# Patient Record
Sex: MALE | Race: WHITE
[De-identification: names, ages, dates, MRNs, and addresses within clinical notes are randomized per-mention and may not be internally consistent; named-entity substitution may affect disease eponyms.]

---

## 2019-11-25 ENCOUNTER — HOSPITAL ENCOUNTER (EMERGENCY)
Dept: HOSPITAL 97 - ER | Age: 79
LOS: 1 days | Discharge: HOME | End: 2019-11-26
Payer: COMMERCIAL

## 2019-11-25 DIAGNOSIS — E86.0: Primary | ICD-10-CM

## 2019-11-25 DIAGNOSIS — I10: ICD-10-CM

## 2019-11-25 LAB
ALBUMIN SERPL BCP-MCNC: 2.6 G/DL (ref 3.4–5)
ALP SERPL-CCNC: 51 U/L (ref 45–117)
ALT SERPL W P-5'-P-CCNC: 28 U/L (ref 12–78)
AST SERPL W P-5'-P-CCNC: 35 U/L (ref 15–37)
BUN BLD-MCNC: 30 MG/DL (ref 7–18)
GLUCOSE SERPLBLD-MCNC: 248 MG/DL (ref 74–106)
HCT VFR BLD CALC: 33.4 % (ref 39.6–49)
LIPASE SERPL-CCNC: 38 U/L (ref 73–393)
LYMPHOCYTES # SPEC AUTO: 0.9 K/UL (ref 0.7–4.9)
PMV BLD: 9.4 FL (ref 7.6–11.3)
POTASSIUM SERPL-SCNC: 4.7 MMOL/L (ref 3.5–5.1)
RBC # BLD: 3.69 M/UL (ref 4.33–5.43)

## 2019-11-25 PROCEDURE — 80048 BASIC METABOLIC PNL TOTAL CA: CPT

## 2019-11-25 PROCEDURE — 96361 HYDRATE IV INFUSION ADD-ON: CPT

## 2019-11-25 PROCEDURE — 80076 HEPATIC FUNCTION PANEL: CPT

## 2019-11-25 PROCEDURE — 99284 EMERGENCY DEPT VISIT MOD MDM: CPT

## 2019-11-25 PROCEDURE — 83690 ASSAY OF LIPASE: CPT

## 2019-11-25 PROCEDURE — 85025 COMPLETE CBC W/AUTO DIFF WBC: CPT

## 2019-11-25 PROCEDURE — 36415 COLL VENOUS BLD VENIPUNCTURE: CPT

## 2019-11-25 PROCEDURE — 74177 CT ABD & PELVIS W/CONTRAST: CPT

## 2019-11-25 PROCEDURE — 96374 THER/PROPH/DIAG INJ IV PUSH: CPT

## 2019-11-26 VITALS — OXYGEN SATURATION: 100 % | DIASTOLIC BLOOD PRESSURE: 57 MMHG | SYSTOLIC BLOOD PRESSURE: 141 MMHG

## 2019-11-26 VITALS — TEMPERATURE: 98.2 F

## 2019-11-26 NOTE — EDPHYS
Physician Documentation                                                                           

 [facility]                                                                 

Name: Canelo Simon                                                                             

Age: 79 yrs                                                                                       

Sex: Male                                                                                         

: 1940                                                                                   

MRN: [id number]                                                                                   

Arrival Date: 2019                                                                          

Time: 21:09                                                                                       

Account#: [id number]                                                                            

Bed 5                                                                                             

Private MD:                                                                                       

ED Physician Buzz Hdz                                                                     

HPI:                                                                                              

                                                                                             

22:36 This 79 yrs old  Male presents to ER via Wheelchair with complaints of         pm1 

      Diarrhea.                                                                                   

22:36 The patient presents to the emergency department with diarrhea, 4-5 times per day.      pm1 

      Onset: The symptoms/episode began/occurred 3 day(s) ago. Possible causes: unknown. The      

      symptoms are aggravated by nothing. The symptoms are alleviated by nothing. Associated      

      signs and symptoms: Pertinent positives: abdominal pain, suprapubic area, Pertinent         

      negatives: dysuria, fever.                                                                  

                                                                                                  

Historical:                                                                                       

- Allergies:                                                                                      

21:42 No Known Allergies;                                                                     rv  

- PMHx:                                                                                           

21:42 Hypertension; Diabetes - NIDDM;                                                         rv  

- PSHx:                                                                                           

21:42 cardiac stents;                                                                         rv  

                                                                                                  

- Immunization history:: Adult Immunizations up to date, Flu vaccine is up to date.               

- Social history:: Smoking status: Patient/guardian denies using tobacco, the patient             

  reports quitting approximately 10 years ago.                                                    

- Ebola Screening: : No symptoms or risks identified at this time.                                

                                                                                                  

                                                                                                  

ROS:                                                                                              

22:36 Constitutional: Negative for fever, chills, and weight loss, Eyes: Negative for injury, pm1 

      pain, redness, and discharge, ENT: Negative for injury, pain, and discharge, Neck:          

      Negative for injury, pain, and swelling, Cardiovascular: Negative for chest pain,           

      palpitations, and edema, Respiratory: Negative for shortness of breath, cough,              

      wheezing, and pleuritic chest pain.                                                         

22:36 Back: Negative for injury and pain, : Negative for injury, bleeding, discharge, and       

      swelling, MS/Extremity: Negative for injury and deformity, Skin: Negative for injury,       

      rash, and discoloration, Neuro: Negative for headache, weakness, numbness, tingling,        

      and seizure.                                                                                

22:36 Abdomen/GI: Positive for abdominal pain, diarrhea, of the suprapubic area, Negative for     

      nausea, vomiting, constipation.                                                             

                                                                                                  

Exam:                                                                                             

22:36 Constitutional:  This is a well developed, well nourished patient who is awake, alert,  pm1 

      and in no acute distress. Head/Face:  Normocephalic, atraumatic. Neck:  Trachea             

      midline, no thyromegaly or masses palpated, and no cervical lymphadenopathy.  Supple,       

      full range of motion without nuchal rigidity, or vertebral point tenderness.  No            

      Meningismus. Chest/axilla:  Normal chest wall appearance and motion.  Nontender with no     

      deformity.  No lesions are appreciated. Cardiovascular:  Regular rate and rhythm with a     

      normal S1 and S2.  No gallops, murmurs, or rubs.  Normal PMI, no JVD.  No pulse             

      deficits. Respiratory:  Lungs have equal breath sounds bilaterally, clear to                

      auscultation and percussion.  No rales, rhonchi or wheezes noted.  No increased work of     

      breathing, no retractions or nasal flaring.                                                 

22:36 Back:  No spinal tenderness.  No costovertebral tenderness.  Full range of motion.          

      Skin:  Warm, dry with normal turgor.  Normal color with no rashes, no lesions, and no       

      evidence of cellulitis.                                                                     

22:36 Abdomen/GI: Inspection: abdomen appears normal, Bowel sounds: normal, Palpation: soft,      

      mild abdominal tenderness, in the suprapubic area, mass, is not appreciated, rebound        

      tenderness, is not appreciated.                                                             

22:36 Musculoskeletal/extremity: Extremities: all appear grossly normal, with no appreciated      

      pain with palpation, ROM: no acute changes, Circulation is intact in all extremities.       

                                                                                                  

Vital Signs:                                                                                      

21:42  / 42; Pulse 74; Resp 17; Temp 98.2; Pulse Ox 97% ; Weight 108.86 kg; Height 5    rv  

      ft. 10 in. (177.80 cm);                                                                     

22:30  / 51; Pulse 63; Resp 18; Pulse Ox 97% on R/A;                                    ea  

23:18  / 46; Pulse 66; Resp 18; Pulse Ox 97% on R/A;                                    ea  

                                                                                             

00:05  / 55; Pulse 62; Pulse Ox 100% on R/A;                                            ak1 

00:10  / 61; Pulse 69; Pulse Ox 100% on R/A;                                            ak1 

00:16  / 44 Supine; Pulse 62; Pulse Ox 95% ;                                            ak1 

01:15  / 57; Pulse 63; Resp 18; Pulse Ox 100% on R/A; Pain 0/10;                        lp1 

                                                                                             

21:42 Body Mass Index 34.44 (108.86 kg, 177.80 cm)                                            rv  

                                                                                                  

MDM:                                                                                              

                                                                                             

22:03 Patient medically screened.                                                             pm1 

22:04 Data reviewed: vital signs. Data interpreted: Pulse oximetry: on room air is 97 %.      pm1 

      Interpretation: normal.                                                                     

                                                                                             

00:26 Counseling: I had a detailed discussion with the patient and/or guardian regarding: the pm1 

      historical points, exam findings, and any diagnostic results supporting the                 

      discharge/admit diagnosis, lab results, radiology results, the need for outpatient          

      follow up, to return to the emergency department if symptoms worsen or persist or if        

      there are any questions or concerns that arise at home.                                     

00:26 Special discussion: I discussed with the patient the need to follow-up with the         pm1 

      PCP/specialist for the noted incidental finding on X-ray/CT scanning. adrenal adenoma       

      and recommendations by radiologist.                                                         

00:26 ED course: Offered admission to the patient for dehydration but patient prefers to go   pm1 

      home. Patient feels better post IV infusion. Will give additional bolus of fluid for 2      

      liters total infused. Patient is not vomiting.                                              

                                                                                                  

                                                                                             

22:12 Order name: Basic Metabolic Panel; Complete Time: 23:17                                 pm1 

                                                                                             

22:12 Order name: CBC with Diff; Complete Time: 22:53                                         pm1 

                                                                                             

22:12 Order name: Creatinine for Radiology; Complete Time: 22:53                              pm1 

                                                                                             

22:12 Order name: Hepatic Function; Complete Time: 23:17                                      pm1 

                                                                                             

22:12 Order name: Lipase; Complete Time: 23:17                                                pm1 

                                                                                             

22:12 Order name: IV Saline Lock; Complete Time: 22:28                                        pm1 

                                                                                             

22:12 Order name: Labs collected and sent; Complete Time: 22:34                               pm1 

                                                                                             

22:12 Order name: CT Abd/Pelvis - IV Contrast Only                                            pm1 

                                                                                                  

Administered Medications:                                                                         

                                                                                             

22:28 Drug: NS 0.9% 1000 ml Route: IV; Rate: 1000 ml; Site: right antecubital;                ea  

23:34 Follow up: Response: No adverse reaction; IV Status: Completed infusion; IV Intake:     ea  

      1000ml                                                                                      

22:47 Drug: fentaNYL (PF) 25 mcg Route: IVP; Site: right antecubital;                         ea  

23:33 Follow up: Response: No adverse reaction; Pain is decreased                             ea  

                                                                                             

00:30 Drug: NS 0.9% 1000 ml Route: IV; Rate: 1000 ml; Site: right antecubital;                ak1 

01:21 Follow up: IV Status: Completed infusion; IV Intake: 1000ml                             lp1 

                                                                                                  

                                                                                                  

Disposition:                                                                                      

03:43 Co-signature as Attending Physician, Buzz Hdz MD I agree with the assessment and tw4 

      plan of care.                                                                               

                                                                                                  

Disposition:                                                                                      

19 01:12 Discharged to Home. Impression: Diarrhea, unspecified, Dehydration.                

- Condition is Stable.                                                                            

- Discharge Instructions: Food Choices to Help Relieve Diarrhea, Adult, Dehydration,              

  Elderly, Diarrhea, Adult, Viral Gastroenteritis, Adult, Rehydration, Elderly.                   

- Prescriptions for Bentyl 20 mg Oral Tablet - take 1 tablet by ORAL route every 6                

  hours As needed; 20 tablet.                                                                     

- Medication Reconciliation Form, Thank You Letter, Antibiotic Education, Prescription            

  Opioid Use form.                                                                                

- Follow up: Emergency Department; When: As needed; Reason: Worsening of condition.               

  Follow up: Private Physician; When: 2 - 3 days; Reason: Recheck today's complaints,             

  Continuance of care, Re-evaluation by your physician.                                           

- Problem is new.                                                                                 

- Symptoms have improved.                                                                         

                                                                                                  

                                                                                                  

                                                                                                  

Signatures:                                                                                       

Dispatcher [employer]                           EDMS                                                 

Jonna Yancey RN                         RN   lp1                                                  

Patria Martínez RN                       RN   ak1                                                  

Bolivar Camarillo, NP                    NP   pm1                                                  

Coretta Shahid, RN                      Buzz Cazares ea, MD MD   tw4                                                  

Selvin Burnett RN                    RN   rv                                                   

                                                                                                  

Corrections: (The following items were deleted from the chart)                                    

01:12 01:12 2019 01:12 Discharged to Home. Impression: Diarrhea, unspecified. Condition pm1 

      is Stable. Forms are Medication Reconciliation Form, Thank You Letter, Antibiotic           

      Education, Prescription Opioid Use. Follow up: Emergency Department; When: As needed;       

      Reason: Worsening of condition. Follow up: Private Physician; When: 2 - 3 days; Reason:     

      Recheck today's complaints, Continuance of care, Re-evaluation by your physician.           

      Problem is new. Symptoms have improved. pm1                                                 

01:21 01:12 2019 01:12 Discharged to Home. Impression: Diarrhea, unspecified;           lp1 

      Dehydration. Condition is Stable. Discharge Instructions: Food Choices to Help Relieve      

      Diarrhea, Adult, Diarrhea, Adult, Viral Gastroenteritis, Adult, Dehydration, Elderly,       

      Rehydration, Elderly. Prescriptions for Bentyl 20 mg Oral Tablet - take 1 tablet by         

      ORAL route every 6 hours As needed; 20 tablet. and Forms are Medication Reconciliation      

      Form, Thank You Letter, Antibiotic Education, Prescription Opioid Use. Follow up:           

      Emergency Department; When: As needed; Reason: Worsening of condition. Follow up:           

      Private Physician; When: 2 - 3 days; Reason: Recheck today's complaints, Continuance of     

      care, Re-evaluation by your physician. Problem is new. Symptoms have improved. pm1          

                                                                                                  

**************************************************************************************************

## 2019-11-26 NOTE — RAD REPORT
EXAM DESCRIPTION:  CT abdomen and pelvis with IV contrast

 

CLINICAL HISTORY:  79-year-old male with abdominal pain, diarrhea and weakness

 

TECHNIQUE:  Axial CT imaging of the abdomen and pelvis was performed following the administration of 
intravenous contrast..   Sagittal and coronal reconstructed images were then performed.   The CT stud
y is performed according to ALARA (as low as reasonably achievable) or ALARA/IMAGE GENTLY, with autom
atic adjustment of mA and/or kV according to patient size.

Performed on: 11/25/2019 at 11:09 PM.

 

COMPARISON:  No prior studies were available for comparison.

 

FINDINGS:  Lung bases: The lung bases are clear. There is minimal bibasilar atelectasis and/or fibros
is.

Liver: The liver is normal in size and configuration. No focal hepatic abnormalities are identified. 
Liver attenuation is within normal limits.

Spleen: The spleen is normal is size, configuration and attenuation.

Gallbladder and bile duct:   The gallbladder is surgically absent. There is no biliary ductal dilatat
ion.

Pancreas: The pancreas is grossly normal in size and configuration.

Adrenal Glands: There is a 1.9 x 1.3 cm low density mass arising from the right adrenal gland measuri
ng approximately 35 Hounsfield units. This likely represents an adrenal adenoma. The left adrenal gla
nd is normal in size and configuration.

Kidneys: The kidneys are normal in size and configuration. There is no evidence of hydronephrosis. Th
ere is no evidence of nephrolithiasis. No definite solid or cystic renal mass lesions are identified.


Stomach: The stomach is grossly normal. There is no definite hiatal hernia.

Bowel: There is bowel wall thickening along the ascending colon as well as portions of the sigmoid co
luba. There is mild infiltration of the pericolonic fat adjacent to the ascending colon. Findings sugg
est underlying colitis which may be infectious, inflammatory or ischemic in etiology.

Appendix: There is no CT evidence of acute appendicitis.

Free air: There is no evidence of free air.

Free fluid: There is no evidence of free fluid.

Vasculature: The aorta is normal in contour and demonstrates extensive atherosclerotic calcification.
 There are atherosclerotic calcifications along the common iliac arteries and major aortic branch ves
sels. The aorta measures approximately 2.6 cm in maximum dimension. The inferior vena cava is grossly
 unremarkable.

Lymphadenopathy: No pathologic lymphadenopathy is identified.

Bladder: The bladder is moderately well distended and smooth in contour.

Reproductive: The prostate gland is grossly within normal limits.

Bones: There are remote postsurgical changes of the lumbar spine consistent with posterior decompress
ion and fusion at L2-L3. A dorsal column stimulator is present as well. There are degenerative change
s throughout the thoracolumbar spine. Left hip arthroplasty is present.

Soft tissues: No focal soft tissue abnormalities are identified.

 

IMPRESSION:  1. Bowel wall thickening along the ascending colon as well as portions of the sigmoid co
luba with associated pericolonic inflammation suggesting underlying colitis which may be infectious, i
nflammatory or ischemic in etiology.

2. Remote cholecystectomy.

3. Small, low-density right adrenal mass likely representing an adrenal adenoma. Recommend one-year f
ollow-up adrenal washout CT. If stable greater than or equal to one year, no further follow-up imagin
g recommended.

4. Extensive atherosclerotic calcification along the abdominal aorta and major branch vessels. The ao
rta measures approximately 2.6 cm in maximum dimension. Recommend follow-up imaging every five years.


5. Prior posterior fusion of L2-L3.

 

Electronically signed by:   Melinda Paula DO   11/25/2019 11:44 PM CST Workstation: [id number]

 

 

Due to temporary technical issues with the PACS/Fluency reporting system, reports are being signed by
 the in house radiologist as a courtesy to ensure prompt reporting. The interpreting radiologist is f
ully responsible for the content of the report.

## 2019-11-26 NOTE — ER
Nurse's Notes                                                                                     

 [facility]                                                                 

Name: Canelo Simon                                                                             

Age: 79 yrs                                                                                       

Sex: Male                                                                                         

: 1940                                                                                   

MRN: [id number]                                                                                   

Arrival Date: 2019                                                                          

Time: 21:09                                                                                       

Account#: [id number]                                                                            

Bed 5                                                                                             

Private MD:                                                                                       

Diagnosis: Diarrhea, unspecified;Dehydration                                                      

                                                                                                  

Presentation:                                                                                     

                                                                                             

21:39 Presenting complaint: Patient states: lbm for three days, having 4-5 bouts per day.     rv  

      feeling week. still able to eat and drink without nausea and vomiting. denies any           

      fever. no new prescription aside from getting a flu shot before the LBM started.            

      Transition of care: patient was not received from another setting of care. Onset of         

      symptoms was 2019 at 08:00. Risk Assessment: Do you want to hurt yourself      

      or someone else? Patient reports no desire to harm self or others. Initial Sepsis           

      Screen: Does the patient meet any 2 criteria? No. Patient's initial sepsis screen is        

      negative. Does the patient have a suspected source of infection? No. Patient's initial      

      sepsis screen is negative. Care prior to arrival: None.                                     

21:39 Method Of Arrival: Wheelchair                                                           rv  

21:39 Acuity: HANANE 3                                                                           rv  

                                                                                                  

Historical:                                                                                       

- Allergies:                                                                                      

21:42 No Known Allergies;                                                                     rv  

- PMHx:                                                                                           

21:42 Hypertension; Diabetes - NIDDM;                                                         rv  

- PSHx:                                                                                           

21:42 cardiac stents;                                                                         rv  

                                                                                                  

- Immunization history:: Adult Immunizations up to date, Flu vaccine is up to date.               

- Social history:: Smoking status: Patient/guardian denies using tobacco, the patient             

  reports quitting approximately 10 years ago.                                                    

- Ebola Screening: : No symptoms or risks identified at this time.                                

                                                                                                  

                                                                                                  

Screenin:57 Abuse screen: Denies threats or abuse. Nutritional screening: No deficits noted.        ea  

      Tuberculosis screening: No symptoms or risk factors identified. Fall Risk None              

      identified.                                                                                 

                                                                                                  

Assessment:                                                                                       

22:40 Reassessment: bedside commode placed for pt to use for stool sample.                    ak1 

22:56 General: Appears uncomfortable, Behavior is calm, cooperative, appropriate for age.     ea  

      Pain: Complains of pain in abdomen. Neuro: Level of Consciousness is awake, alert,          

      obeys commands, Oriented to person, place, time, situation. Cardiovascular: Patient's       

      skin is warm and dry. Respiratory: Airway is patent Respiratory effort is even,             

      unlabored, Respiratory pattern is regular, symmetrical. GI: Abdomen is non-distended.       

      Derm: Skin is dry, Skin is pale, Skin temperature is warm.                                  

22:59 Reassessment: Patient and/or family updated on plan of care and expected duration. Pain ea  

      level reassessed. Patient is alert, oriented x 3, equal unlabored respirations, skin        

      warm/dry/pink. Pt taken to CT.                                                              

23:03 Reassessment: pt taken to CT.                                                           ak1 

23:12 Reassessment: Patient and/or family updated on plan of care and expected duration. Pain ea  

      level reassessed. Patient is alert, oriented x 3, equal unlabored respirations, skin        

      warm/dry/pink. Pt returned from CT.                                                         

23:35 Reassessment: Patient and/or family updated on plan of care and expected duration. Pain ea  

      level reassessed. Patient is alert, oriented x 3, equal unlabored respirations, skin        

      warm/dry/pink. Provider at bedside updating pt on plan of care.                             

                                                                                                  

Vital Signs:                                                                                      

21:42  / 42; Pulse 74; Resp 17; Temp 98.2; Pulse Ox 97% ; Weight 108.86 kg; Height 5    rv  

      ft. 10 in. (177.80 cm);                                                                     

22:30  / 51; Pulse 63; Resp 18; Pulse Ox 97% on R/A;                                    ea  

23:18  / 46; Pulse 66; Resp 18; Pulse Ox 97% on R/A;                                    ea  

                                                                                             

00:05  / 55; Pulse 62; Pulse Ox 100% on R/A;                                            ak1 

00:10  / 61; Pulse 69; Pulse Ox 100% on R/A;                                            ak1 

00:16  / 44 Supine; Pulse 62; Pulse Ox 95% ;                                            ak1 

01:15  / 57; Pulse 63; Resp 18; Pulse Ox 100% on R/A; Pain 0/10;                        lp1 

                                                                                             

21:42 Body Mass Index 34.44 (108.86 kg, 177.80 cm)                                            rv  

                                                                                                  

ED Course:                                                                                        

                                                                                             

21:09 Patient arrived in ED.                                                                  ag3 

21:40 Triage completed.                                                                       rv  

21:53 Bolivar Camarillo NP is PHCP.                                                           pm1 

21:53 Buzz Hdz MD is Attending Physician.                                            pm1 

21:57 Patria Martínez, RN is Primary Nurse.                                                     ak1 

22:18 Radiology exam delayed due to lab results not completed at this time.                   2 

22:27 Inserted saline lock: 20 gauge in right antecubital area, using aseptic technique.      ea  

      Blood collected.                                                                            

22:57 Patient has correct armband on for positive identification. Bed in low position. Call   ea  

      light in reach. Side rails up X2. Adult w/ patient.                                         

22:57 Arm band placed on right wrist. Patient placed in an exam room, on a stretcher, on      ea  

      pulse oximetry.                                                                             

23:13 CT completed. Patient tolerated procedure well. Patient moved back from CT.             2 

23:17 CT Abd/Pelvis - IV Contrast Only In Process Unspecified.                                EDMS

                                                                                             

01:21 No provider procedures requiring assistance completed. IV discontinued, No              lp1 

      redness/swelling at site. Pressure dressing applied.                                        

                                                                                                  

Administered Medications:                                                                         

                                                                                             

22:28 Drug: NS 0.9% 1000 ml Route: IV; Rate: 1000 ml; Site: right antecubital;                ea  

23:34 Follow up: Response: No adverse reaction; IV Status: Completed infusion; IV Intake:     ea  

      1000ml                                                                                      

22:47 Drug: fentaNYL (PF) 25 mcg Route: IVP; Site: right antecubital;                         ea  

23:33 Follow up: Response: No adverse reaction; Pain is decreased                             ea  

                                                                                             

00:30 Drug: NS 0.9% 1000 ml Route: IV; Rate: 1000 ml; Site: right antecubital;                ak1 

01:21 Follow up: IV Status: Completed infusion; IV Intake: 1000ml                             lp1 

                                                                                                  

                                                                                                  

Intake:                                                                                           

                                                                                             

23:34 IV: 1000ml; Total: 1000ml.                                                              ea  

                                                                                             

01:21 IV: 1000ml; Total: 2000ml.                                                              lp1 

                                                                                                  

Outcome:                                                                                          

00:17 Condition: improved                                                                     ak1 

01:12 Discharge ordered by MD.                                                                pm1 

01:21 Discharged to home via wheelchair, with family.                                         lp1 

01:21 Discharge instructions given to patient, Instructed on discharge instructions, follow       

      up and referral plans. medication usage, Demonstrated understanding of instructions,        

      follow-up care, medications, Prescriptions given X 1.                                       

01:21 Patient left the ED.                                                                    lp1 

                                                                                                  

Signatures:                                                                                       

Dispatcher [employer]                           EDMS                                                 

Jonna Yancey RN RN   lp1                                                  

Patria Martínez RN RN   ak1                                                  

Bolivar Camarillo, NP                    NP   pm1                                                  

Keena Mccormick                            2                                                  

Shahid, Coretta, RN                      RN   Selvin Faust, RN                    RN   roalia George, Ange                                 ag3                                                  

                                                                                                  

**************************************************************************************************

## 2022-01-30 ENCOUNTER — HOSPITAL ENCOUNTER (EMERGENCY)
Dept: HOSPITAL 97 - ER | Age: 82
Discharge: HOME | End: 2022-01-30
Payer: COMMERCIAL

## 2022-01-30 VITALS — TEMPERATURE: 97.6 F | OXYGEN SATURATION: 100 %

## 2022-01-30 VITALS — SYSTOLIC BLOOD PRESSURE: 205 MMHG | DIASTOLIC BLOOD PRESSURE: 78 MMHG

## 2022-01-30 DIAGNOSIS — M54.32: Primary | ICD-10-CM

## 2022-01-30 DIAGNOSIS — E11.9: ICD-10-CM

## 2022-01-30 DIAGNOSIS — I10: ICD-10-CM

## 2022-01-30 PROCEDURE — 93971 EXTREMITY STUDY: CPT

## 2022-01-30 PROCEDURE — 99284 EMERGENCY DEPT VISIT MOD MDM: CPT

## 2022-01-30 NOTE — RAD REPORT
EXAM DESCRIPTION:  RAD - Femur Left - 1/30/2022 4:42 pm

 

CLINICAL HISTORY:  PAIN

 

COMPARISON:  None.

 

FINDINGS:  Two images were submitted from midshaft femur to the knee joint. Proximal femur is evaluat
ed in a separate left hip report. Tip of the femoral component is identified. Focal sclerotic changes
 in the distal femoral metaphysis could be enchondroma or bone infarct. No acute cortical or medullar
y process identifiable. Linear metallic surgical device is present in the distal shaft of the femur. 
No joint effusions seen. Degenerative changes in the knee joint are minimal. No pathologic bone proce
ss. Dense arterial tree calcifications are present. No air or foreign body in the soft tissues.

 

 

IMPRESSION:  Imaging of the distal left femur shows chronic findings as detailed above. No acute proc
ess.

 

Proximal femur detailed as part of the left hip report.

## 2022-01-30 NOTE — ER
Nurse's Notes                                                                                     

 [facility]                                                                 

Name: Canelo Simon                                                                             

Age: 81 yrs                                                                                       

Sex: Male                                                                                         

: 1940                                                                                   

MRN: [id number]                                                                                   

Arrival Date: 2022                                                                          

Time: 13:23                                                                                       

Account#: [id number]                                                                            

Bed 11                                                                                            

Private MD:                                                                                       

Diagnosis: Sciatica, left side                                                                    

                                                                                                  

Presentation:                                                                                     

                                                                                             

13:31 Chief complaint: Patient states: pain starts in lower back and radiates into hip and    vg1 

      down to left ankle. EMS states: Left hip pain x 5 days; states no injuries.                 

13:31 Method Of Arrival: EMS: [facility]                                                       vg1 

15:33 Coronavirus screen: Vaccine status: Patient reports receiving the 2nd dose of the covid vg1 

      vaccine. Client denies travel out of the [place] in the last 14 days. Ebola Screen:            

      Patient negative for fever greater than or equal to 101.5 degrees Fahrenheit, and           

      additional compatible Ebola Virus Disease symptoms. Initial Sepsis Screen: Does the         

      patient meet any 2 criteria? No. Patient's initial sepsis screen is negative. Does the      

      patient have a suspected source of infection? Yes:. Risk Assessment: Do you want to         

      hurt yourself or someone else? Patient reports no desire to harm self or others. Onset      

      of symptoms was 2022.                                                           

15:33 Acuity: HANANE 3                                                                           vg1 

                                                                                                  

Triage Assessment:                                                                                

15:47 General: Appears uncomfortable, Behavior is cooperative. Pain: Complains of pain in     vg1 

      left leg Pain currently is 10 out of 10 on a pain scale.                                    

                                                                                                  

Historical:                                                                                       

- Allergies:                                                                                      

15:38 PENICILLINS;                                                                            vg1 

- Home Meds:                                                                                      

15:38 Albuterol Inhl [Active];                                                                vg1 

15:49 atorvastatin oral [Active]; Bisacodyl Oral [Active]; cyanocobalamin (vitamin B-12) oral vg1 

      [Active]; Docusate Sodium Oral [Active]; Hydralazine Oral [Active]; Lisinopril Oral         

      [Active]; Insulin: Novolin N Sub-Q [Active]; Metoprolol Tartrate Oral [Active]; Aspirin     

      Oral [Active]; Omeprazole Oral [Active];                                                    

- PMHx:                                                                                           

15:38 Diabetes - NIDDM; Hypertension;                                                         vg1 

- PSHx:                                                                                           

15:38 Hip; Left; Back;                                                                        vg1 

                                                                                                  

- Immunization history:: Client reports receiving the 2nd dose of the Covid vaccine.              

- Social history:: Smoking status: Patient denies any tobacco usage or history of.                

                                                                                                  

                                                                                                  

Screenin:41 Abuse screen: Denies threats or abuse. Nutritional screening: No deficits noted.        ll1 

      Tuberculosis screening: No symptoms or risk factors identified. Fall Risk Ambulatory        

      Aid- Crutches/Cane/Walker (15 pts). Gait- Weak (10 pts.). Total Vela Fall Scale            

      indicates Low Risk Score (25-44 pts). Fall prevention measures have been instituted.        

      Side Rails Up X 2 Frequent Obs/Assesments occuring As available Patient and Family          

      Educated on Fall Prevention Program and strategies.                                         

                                                                                                  

Assessment:                                                                                       

16:45 Reassessment: No changes from previously documented assessment. Patient and/or family   ll1 

      updated on plan of care and expected duration. Pain level reassessed. Patient is alert,     

      oriented x 3, equal unlabored respirations, skin warm/dry/pink.                             

17:40 Reassessment: No changes from previously documented assessment. Patient and/or family   ll1 

      updated on plan of care and expected duration. Pain level reassessed. Patient is alert,     

      oriented x 3, equal unlabored respirations, skin warm/dry/pink.                             

                                                                                                  

Vital Signs:                                                                                      

15:33 Pulse 66; Resp 20; Temp 97.6; Pulse Ox 100% ; Weight 92.99 kg; Height 5 ft. 11 in.      vg1 

      (180.34 cm); Pain 10/10;                                                                    

15:47  / 78;                                                                            vg1 

15:33 Body Mass Index 28.59 (92.99 kg, 180.34 cm)                                             vg1 

                                                                                                  

ED Course:                                                                                        

13:23 Patient arrived in ED.                                                                  ds1 

15:38 Triage completed.                                                                       vg1 

15:41 [facility]                                                        kb  

15:41 Raul Guerra MD is Attending Physician.                                             kb  

15:42 Alistair Salcedo RN is Primary Nurse.                                                     ll1 

15:42 Arm band placed on Patient placed in an exam room, on a stretcher.                      ll1 

16:22 US Extremity Venous Unilateral Ltd In Process Unspecified.                              EDMS

16:42 Hip Left 2 View XRAY In Process Unspecified.                                            EDMS

16:42 Femur Left XRAY In Process Unspecified.                                                 EDMS

17:41 Patient has correct armband on for positive identification. Bed in low position. Call   ll1 

      light in reach. Side rails up X 1. Cardiac monitoring not applicable on this patient.       

17:41 No provider procedures requiring assistance completed. Patient did not have IV access   ll1 

      during this emergency room visit.                                                           

                                                                                                  

Administered Medications:                                                                         

16:04 Drug: traMADol 50 mg Route: PO;                                                         ll1 

17:41 Follow up: Response: No adverse reaction                                                ll1 

                                                                                                  

                                                                                                  

Outcome:                                                                                          

17:33 Discharge ordered by MD. cary  

17:41 Discharged to home via wheelchair.                                                      ll1 

17:41 Condition: stable                                                                           

17:41 Discharge instructions given to patient, family, Instructed on discharge instructions,      

      follow up and referral plans. no drinking with medication, no driving heavy equipment,      

      medication usage, Demonstrated understanding of instructions, follow-up care,               

      medications, Prescriptions given X 2.                                                       

17:42 Patient left the ED.                                                                    1 

                                                                                                  

Signatures:                                                                                       

Dispatcher [employer]                           EDFabby Islas, RHYS MEJIA-Amanda Mckay                                ds1                                                  

Keena Martin, RN                    RN   vg1                                                  

Alistair Salcedo RN                       RN   ll1                                                  

                                                                                                  

Corrections: (The following items were deleted from the chart)                                    

15:47 13:31 Chief complaint: EMS states: Left hip pain x 5 days; states no injuries. vg1      vg1 

                                                                                                  

**************************************************************************************************

## 2022-01-30 NOTE — RAD REPORT
EXAM DESCRIPTION:  US - Extremity Venous Uni Ltd - 1/30/2022 4:22 pm

 

CLINICAL HISTORY:  PAIN

 

COMPARISON:  None.

 

TECHNIQUE:  Real-time sonographic evaluation of the left lower extremity deep venous system was perfo
rmed.

 

FINDINGS:  Normal compressibility, flow augmentation, phasic flow and spontaneous flow are identified
 in the left lower extremity common femoral, superficial femoral, and popliteal veins. Left posterior
 tibial vein was more difficult to visualize. No thrombus was confirmed. No intraluminal filling defe
cts seen.

 

IMPRESSION:  No DVT in the left lower extremity identifiable.

## 2022-01-30 NOTE — RAD REPORT
EXAM DESCRIPTION:  RAD - Hip Left 2 View - 1/30/2022 4:42 pm

 

CLINICAL HISTORY:  PAINhistory of replacement x2

 

COMPARISON:  No comparisons

 

FINDINGS:  AP and frogleg views of the left hip were obtained. Film technique was not optimal.

 

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 Bony remodeling is seen near the tip of the implant
 along the lateral cortical margin of the femur. Intertrochanteric region is osteopenic. No acute bon
e process identifiable. Lucent changes are present along the proximal aspect of the shaft. Femoral co
mponent loosening cannot be excluded as a possible source for pain. No acute acetabular finding. The 
left hemipelvis as imaged is unremarkable. SI joint degenerative changes are present.

 

No soft tissue abnormality.

 

 

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 No fracture or acute finding.

 

Loosening of the femoral component cannot be excluded as source for pain.

## 2022-01-30 NOTE — EDPHYS
Physician Documentation                                                                           

 [facility]                                                                 

Name: Canelo Simon                                                                             

Age: 81 yrs                                                                                       

Sex: Male                                                                                         

: 1940                                                                                   

MRN: [id number]                                                                                   

Arrival Date: 2022                                                                          

Time: 13:23                                                                                       

Account#: [id number]                                                                            

Bed 11                                                                                            

Private MD:                                                                                       

ED Physician Raul Guerra                                                                      

HPI:                                                                                              

                                                                                             

17:30 This 81 yrs old Male presents to ER via EMS with complaints of Hip Pain.                kb  

17:30 The patient presents with pain that is acute, and tenderness. The symptoms are located  kb  

      in the left low back.                                                                       

17:31 The pain radiates to the left leg. The problem was sustained from unknown cause. Onset: kb  

      The symptoms/episode began/occurred 5 day(s) ago. Modifying factors: The patient            

      symptoms are alleviated by nothing, the patient symptoms are aggravated by supine           

      position. Associated signs and symptoms: The patient has no apparent associated signs       

      or symptoms. Severity of symptoms: At their worst the symptoms were moderate, in the        

      emergency department the symptoms are unchanged. The patient has not experienced            

      similar symptoms in the past. The patient has not recently seen a physician. Pt reports     

      pain to left low back that radiates down buttock and left leg. States he woke up with       

      the pain 5 days ago. Denies injury or trauma. Pain worse when laying flat.                  

                                                                                                  

Historical:                                                                                       

- Allergies:                                                                                      

15:38 PENICILLINS;                                                                            vg1 

- Home Meds:                                                                                      

15:38 Albuterol Inhl [Active];                                                                vg1 

15:49 atorvastatin oral [Active]; Bisacodyl Oral [Active]; cyanocobalamin (vitamin B-12) oral vg1 

      [Active]; Docusate Sodium Oral [Active]; Hydralazine Oral [Active]; Lisinopril Oral         

      [Active]; Insulin: Novolin N Sub-Q [Active]; Metoprolol Tartrate Oral [Active]; Aspirin     

      Oral [Active]; Omeprazole Oral [Active];                                                    

- PMHx:                                                                                           

15:38 Diabetes - NIDDM; Hypertension;                                                         vg1 

- PSHx:                                                                                           

15:38 Hip; Left; Back;                                                                        vg1 

                                                                                                  

- Immunization history:: Client reports receiving the 2nd dose of the Covid vaccine.              

- Social history:: Smoking status: Patient denies any tobacco usage or history of.                

                                                                                                  

                                                                                                  

ROS:                                                                                              

17:29 Constitutional: Negative for fever, chills, and weight loss.                            kb  

17:29 Back: Positive for pain at rest, pain with movement.                                        

17:29 MS/extremity: Positive for pain, tenderness, of the left gluteus chava and left low       

      back and left leg.                                                                          

17:29 All other systems are negative.                                                             

                                                                                                  

Exam:                                                                                             

17:28 Constitutional:  This is a well developed, well nourished patient who is awake, alert,  kb  

      and in no acute distress. Head/Face:  Normocephalic, atraumatic. ENT:  Moist Mucous         

      membranes Respiratory:  Respirations even and unlabored. No increased work of               

      breathing. Talking in full sentences Skin:  Warm, dry with normal turgor.  Normal           

      color. Neuro:  Awake and alert, GCS 15, oriented to person, place, time, and situation.     

      Moves all extremities. Normal gait. Psych:  Awake, alert, with orientation to person,       

      place and time.  Behavior, mood, and affect are within normal limits.                       

17:28 Back: pain, that is mild, that is moderate, of the  left low back, vertebral                

      tenderness, is not appreciated.                                                             

17:28 Musculoskeletal/extremity: Extremities: grossly normal except: noted in the left            

      gluteus chava: pain, tenderness, ROM: limited active range of motion due to pain,         

      Circulation is intact in all extremities. Sensation intact. Weight bearing: able to         

      fully bear weight.                                                                          

                                                                                                  

Vital Signs:                                                                                      

15:33 Pulse 66; Resp 20; Temp 97.6; Pulse Ox 100% ; Weight 92.99 kg; Height 5 ft. 11 in.      vg1 

      (180.34 cm); Pain 10/10;                                                                    

15:47  / 78;                                                                            vg1 

15:33 Body Mass Index 28.59 (92.99 kg, 180.34 cm)                                             vg1 

                                                                                                  

MDM:                                                                                              

15:41 Patient medically screened.                                                             kb  

17:28 Data reviewed: vital signs, nurses notes. Data interpreted: Pulse oximetry: on room air kb  

      is 100 %. Interpretation: normal. Counseling: I had a detailed discussion with the          

      patient and/or guardian regarding: the historical points, exam findings, and any            

      diagnostic results supporting the discharge/admit diagnosis, radiology results, the         

      need for outpatient follow up, a family practitioner, to return to the emergency            

      department if symptoms worsen or persist or if there are any questions or concerns that     

      arise at home.                                                                              

                                                                                                  

                                                                                             

15:43 Order name: Hip Left 2 View XRAY; Complete Time: 17:27                                  kb  

                                                                                             

15:48 Order name: Femur Left XRAY; Complete Time: 17:27                                       vg1 

                                                                                             

15:48 Order name: US Extremity Venous Unilateral Ltd; Complete Time: 17:21                    vg1 

                                                                                                  

Administered Medications:                                                                         

16:04 Drug: traMADol 50 mg Route: PO;                                                         ll1 

17:41 Follow up: Response: No adverse reaction                                                ll1 

                                                                                                  

                                                                                                  

Disposition:                                                                                      

                                                                                             

10:35 Co-signature as Attending Physician, Raul Guerra MD I agree with the assessment and  poly 

      plan of care.                                                                               

                                                                                                  

Disposition Summary:                                                                              

22 17:33                                                                                    

Discharge Ordered                                                                                 

      Location: Home                                                                          kb  

      Condition: Stable                                                                       kb  

      Diagnosis                                                                                   

        - Sciatica, left side                                                                 kb  

      Followup:                                                                               kb  

        - With: Emergency Department                                                               

        - When: As needed                                                                          

        - Reason: Worsening of condition                                                           

      Followup:                                                                               kb  

        - With: Private Physician                                                                  

        - When: 2 - 3 days                                                                         

        - Reason: Recheck today's complaints, Continuance of care, Re-evaluation by your           

      physician                                                                                   

      Discharge Instructions:                                                                     

        - Discharge Summary Sheet                                                             kb  

        - Sciatica, Easy-to-Read                                                              kb  

        - Back Exercises, Easy-to-Read                                                        kb  

      Forms:                                                                                      

        - Medication Reconciliation Form                                                      kb  

        - Thank You Letter                                                                    kb  

        - Antibiotic Education                                                                kb  

        - Prescription Opioid Use                                                             kb  

      Prescriptions:                                                                              

        - Cyclobenzaprine 10 mg Oral Tablet                                                        

            - take 1 tablet by ORAL route every 8 hours As needed; 21 tablet; Refills: 0,     kb  

      Product Selection Permitted                                                                 

        - Diclofenac Sodium 75 mg Oral tablet,delayed release (DR/EC)                              

            - take 1 tablet by ORAL route 2 times per day As needed; 30 tablet; Refills: 0,   kb  

      Product Selection Permitted                                                                 

Signatures:                                                                                       

Dispatcher [employer]                           Fabby Little, CANDACEC                 ROBERTO-Raul Manning MD MD cha Garcia, Victoria, RN                    RN   vg1                                                  

Alistair Salcedo RN                       RN   1                                                  

                                                                                                  

**************************************************************************************************

## 2023-07-28 ENCOUNTER — HOSPITAL ENCOUNTER (INPATIENT)
Dept: HOSPITAL 97 - ER | Age: 83
LOS: 3 days | Discharge: HOME HEALTH | DRG: 923 | End: 2023-07-31
Attending: HOSPITALIST | Admitting: HOSPITALIST
Payer: COMMERCIAL

## 2023-07-28 VITALS — BODY MASS INDEX: 24 KG/M2

## 2023-07-28 DIAGNOSIS — N17.9: ICD-10-CM

## 2023-07-28 DIAGNOSIS — E83.42: ICD-10-CM

## 2023-07-28 DIAGNOSIS — E11.65: ICD-10-CM

## 2023-07-28 DIAGNOSIS — D64.9: ICD-10-CM

## 2023-07-28 DIAGNOSIS — E83.51: ICD-10-CM

## 2023-07-28 DIAGNOSIS — R77.8: ICD-10-CM

## 2023-07-28 DIAGNOSIS — T68.XXXA: ICD-10-CM

## 2023-07-28 DIAGNOSIS — Z88.0: ICD-10-CM

## 2023-07-28 DIAGNOSIS — I10: ICD-10-CM

## 2023-07-28 DIAGNOSIS — T67.5XXA: Primary | ICD-10-CM

## 2023-07-28 LAB
HCT VFR BLD CALC: 30.4 % (ref 39.6–49)
LYMPHOCYTES # SPEC AUTO: 0.9 K/UL (ref 0.7–4.9)
MCV RBC: 90.7 FL (ref 80–100)
PMV BLD: 8.9 FL (ref 7.6–11.3)
RBC # BLD: 3.36 M/UL (ref 4.33–5.43)

## 2023-07-28 PROCEDURE — 84484 ASSAY OF TROPONIN QUANT: CPT

## 2023-07-28 PROCEDURE — 82533 TOTAL CORTISOL: CPT

## 2023-07-28 PROCEDURE — 93306 TTE W/DOPPLER COMPLETE: CPT

## 2023-07-28 PROCEDURE — 85730 THROMBOPLASTIN TIME PARTIAL: CPT

## 2023-07-28 PROCEDURE — 81001 URINALYSIS AUTO W/SCOPE: CPT

## 2023-07-28 PROCEDURE — 84100 ASSAY OF PHOSPHORUS: CPT

## 2023-07-28 PROCEDURE — 82947 ASSAY GLUCOSE BLOOD QUANT: CPT

## 2023-07-28 PROCEDURE — 83735 ASSAY OF MAGNESIUM: CPT

## 2023-07-28 PROCEDURE — 82607 VITAMIN B-12: CPT

## 2023-07-28 PROCEDURE — 80048 BASIC METABOLIC PNL TOTAL CA: CPT

## 2023-07-28 PROCEDURE — 86140 C-REACTIVE PROTEIN: CPT

## 2023-07-28 PROCEDURE — 87040 BLOOD CULTURE FOR BACTERIA: CPT

## 2023-07-28 PROCEDURE — 36415 COLL VENOUS BLD VENIPUNCTURE: CPT

## 2023-07-28 PROCEDURE — 99285 EMERGENCY DEPT VISIT HI MDM: CPT

## 2023-07-28 PROCEDURE — 84439 ASSAY OF FREE THYROXINE: CPT

## 2023-07-28 PROCEDURE — 83880 ASSAY OF NATRIURETIC PEPTIDE: CPT

## 2023-07-28 PROCEDURE — 82550 ASSAY OF CK (CPK): CPT

## 2023-07-28 PROCEDURE — 85610 PROTHROMBIN TIME: CPT

## 2023-07-28 PROCEDURE — 84443 ASSAY THYROID STIM HORMONE: CPT

## 2023-07-28 PROCEDURE — 85025 COMPLETE CBC W/AUTO DIFF WBC: CPT

## 2023-07-28 PROCEDURE — 83540 ASSAY OF IRON: CPT

## 2023-07-28 PROCEDURE — 83605 ASSAY OF LACTIC ACID: CPT

## 2023-07-28 PROCEDURE — 71045 X-RAY EXAM CHEST 1 VIEW: CPT

## 2023-07-28 PROCEDURE — 85044 MANUAL RETICULOCYTE COUNT: CPT

## 2023-07-28 PROCEDURE — 70450 CT HEAD/BRAIN W/O DYE: CPT

## 2023-07-28 PROCEDURE — 80053 COMPREHEN METABOLIC PANEL: CPT

## 2023-07-28 PROCEDURE — 96365 THER/PROPH/DIAG IV INF INIT: CPT

## 2023-07-28 PROCEDURE — 93005 ELECTROCARDIOGRAM TRACING: CPT

## 2023-07-29 LAB
ALBUMIN SERPL BCP-MCNC: 2.6 G/DL (ref 3.4–5)
ALP SERPL-CCNC: 55 U/L (ref 45–117)
ALT SERPL W P-5'-P-CCNC: 19 U/L (ref 16–61)
AST SERPL W P-5'-P-CCNC: 15 U/L (ref 15–37)
BUN BLD-MCNC: 30 MG/DL (ref 7–18)
BUN BLD-MCNC: 32 MG/DL (ref 7–18)
GLUCOSE SERPLBLD-MCNC: 220 MG/DL (ref 74–106)
GLUCOSE SERPLBLD-MCNC: 255 MG/DL (ref 74–106)
HCT VFR BLD CALC: 31.5 % (ref 39.6–49)
INR BLD: 1.15
LYMPHOCYTES # SPEC AUTO: 2.3 K/UL (ref 0.7–4.9)
MAGNESIUM SERPL-MCNC: 1.3 MG/DL (ref 1.6–2.4)
MCV RBC: 90.5 FL (ref 80–100)
NT-PROBNP SERPL-MCNC: 1201 PG/ML (ref ?–450)
PMV BLD: 9.5 FL (ref 7.6–11.3)
POTASSIUM SERPL-SCNC: 4.1 MEQ/L (ref 3.5–5.1)
POTASSIUM SERPL-SCNC: 4.3 MEQ/L (ref 3.5–5.1)
RBC # BLD: 3.48 M/UL (ref 4.33–5.43)
TROPONIN I SERPL HS-MCNC: 39.2 PG/ML (ref ?–58.9)

## 2023-07-29 RX ADMIN — Medication SCH ML: at 08:39

## 2023-07-29 RX ADMIN — HUMAN INSULIN SCH: 100 INJECTION, SOLUTION SUBCUTANEOUS at 07:30

## 2023-07-29 RX ADMIN — SODIUM CHLORIDE SCH MLS: 0.9 INJECTION, SOLUTION INTRAVENOUS at 13:00

## 2023-07-29 RX ADMIN — SODIUM CHLORIDE SCH MLS: 0.9 INJECTION, SOLUTION INTRAVENOUS at 06:27

## 2023-07-29 RX ADMIN — HYDRALAZINE HYDROCHLORIDE SCH MG: 25 TABLET, FILM COATED ORAL at 20:52

## 2023-07-29 RX ADMIN — HYDRALAZINE HYDROCHLORIDE SCH MG: 25 TABLET, FILM COATED ORAL at 14:57

## 2023-07-29 RX ADMIN — HUMAN INSULIN SCH UNIT: 100 INJECTION, SOLUTION SUBCUTANEOUS at 20:52

## 2023-07-29 RX ADMIN — Medication SCH ML: at 08:41

## 2023-07-29 RX ADMIN — Medication SCH ML: at 20:54

## 2023-07-29 RX ADMIN — HUMAN INSULIN SCH UNIT: 100 INJECTION, SOLUTION SUBCUTANEOUS at 17:22

## 2023-07-29 RX ADMIN — HEPARIN SODIUM SCH UNIT: 5000 INJECTION, SOLUTION INTRAVENOUS; SUBCUTANEOUS at 08:38

## 2023-07-29 RX ADMIN — GABAPENTIN SCH MG: 300 CAPSULE ORAL at 20:52

## 2023-07-29 RX ADMIN — HUMAN INSULIN SCH: 100 INJECTION, SOLUTION SUBCUTANEOUS at 11:30

## 2023-07-29 RX ADMIN — HEPARIN SODIUM SCH UNIT: 5000 INJECTION, SOLUTION INTRAVENOUS; SUBCUTANEOUS at 16:23

## 2023-07-29 NOTE — P.HP
Certification for Inpatient


Patient admitted to: Observation


Patient will require the following post-hospital care: None


Practitioner: I am a practitioner with admitting privileges, knowledge of 

patient current condition, hospital course, and medical plan of care.


Services: Services provided to patient in accordance with Admission requirements

found in Title 42 Section 412.3 of the Code of Federal Regulations





Patient History


Date of Service: 07/29/23


Reason for admission: heat exhaustion


History of Present Illness: 





82-year-old male with a past medical history of hypertension, diabetes presents 

to the emergency room via EMS for altered mental status.  He was reportedly 

found under blankets in his room, hypothermic temperature 105, hypoxic O2 

saturation 82%, patient was placed on a nonrebreather, and cooling blanket with 

significant improvement and changes in mental status.  Plan to admit for heat 

exhaustion, acute kidney injury.laboratory evaluation acute kidney injury, BUN 

32, creatinine 1.34, estimated GFR 53, blood glucose 255, hypocalcemia 7.8, 

hypomagnesia 1.3, elevated BNP 1/2/2001, UA unremarkable, CBC normocytic anemia 

10.1, 30.4, mild left shift 85.5, CT of the head ordered,'s chest x-ray ordered.











Review of Systems


10-point ROS is otherwise unremarkable





Physical Examination





- Physical Exam


General: Alert, In no apparent distress, Oriented x3


HEENT: Atraumatic, Normocephalic, PERRLA


Neck: Supple, 2+ carotid pulse no bruit


Respiratory: Clear to auscultation bilaterally, Normal air movement


Cardiovascular: No edema, Normal pulses, Regular rate/rhythm


Capillary refill: <2 Seconds


Gastrointestinal: Normal bowel sounds, Non-distended


Musculoskeletal: No clubbing, No swelling


Integumentary: No rashes, No breakdown


Neurological: Normal speech, Normal strength at 5/5 x4 extr, Cranial nerves 3-12

intact





- Studies


Laboratory Data (last 24 hrs)











  07/28/23 07/28/23 07/28/23





  23:45 23:45 23:45


 


WBC    8.10


 


Hgb    10.1 L


 


Hct    30.4 L


 


Plt Count    138 L


 


PT  12.6 H  


 


INR  1.15  


 


APTT  33.6  


 


Sodium   133 L 


 


Potassium   4.1 


 


BUN   32 H 


 


Creatinine   1.34 H 


 


Glucose   255 H 


 


Magnesium   1.3 L 


 


Total Bilirubin   0.4 


 


AST   15 


 


ALT   19 


 


Alkaline Phosphatase   55 











Assessment and Plan





- Plan


Assessment and plan 


Metabolic encephalopathy versus delirium


heat exhaustion


acute kidney injury


Hyperglycemia


Hypocalcemia


Hypomagnesia


Normocytic anemia


Essential hypertension


Diabetes


DVT prophylaxis











Assessment and plan 


Metabolic encephalopathy vs delirium-likely related to heat exhaustion


 UA unremarkable,


CT of the head ordered,'s chest x-ray ordered.


Fall precautions





heat exhaustion


IV fluids, Tylenol for elevated temperature





acute kidney injury


laboratory evaluation acute kidney injury, BUN 32, creatinine 1.34, estimated 

GFR 53,


IV fluids,


Repeat kidney function labs in the a.m.





Hypocalcemia


Hypomagnesia


Trend electrolytes replace as needed


hypocalcemia 7.8, hypomagnesia 1.3





Normocytic anemia


 CBC normocytic anemia 10.1, 30.4, mild left shift 85.5, 





Essential hypertension


Diabetes


BG ACHS, SSI


resume appop home meds





Cardiac diet


Full code


DVT Lovenox





Discharge Plan: Home


Plan to discharge in: 24 Hours





- Advance Directives


Does patient have a Living Will: No


Does patient have a Durable POA for Healthcare: No





- Code Status/Comfort Care


Code Status: Full Code


Physician Review: Patient Assessed, Agree with Above Assessment and Plan


Critical Care: No


Time Spent Managing Pts Care (In Minutes): 50

## 2023-07-29 NOTE — EDPHYS
Physician Documentation                                                                           

 [facility]                                                                 

Name: Canelo Simon                                                                             

Age: 82 yrs                                                                                       

Sex: Male                                                                                         

: 1940                                                                                   

MRN: [id number]                                                                                   

Arrival Date: 2023                                                                          

Time: 23:14                                                                                       

Account#: [id number]                                                                            

Bed 8                                                                                             

Private MD:                                                                                       

ED Physician Ang Suggs                                                                     

HPI:                                                                                              

                                                                                             

02:03 This 82 yrs old Male presents to ER via EMS with complaints of Altered mental status.   rt  

02:03 Patient presents to the ED with an altered mental status. Patient was reportedly found  rt  

      completely unresponsive in a warm room under blankets. The patient's axillary               

      temperature was 105 degrees by EMS. Oxygen saturation was about 82%. Patient was placed     

      on a nonrebreather, started with cooling. Patient subsequently had significant              

      improvement of the mental status. Denies other acute complaints at this time, symptoms      

      are severe in severity, no other aggravating or alleviating factors..                       

                                                                                                  

Historical:                                                                                       

- Allergies:                                                                                      

                                                                                             

23:36 PENICILLINS;                                                                            vc1 

- PMHx:                                                                                           

23:36 coronary atherosclerosis; Diabetes - NIDDM; Hypertension;                               vc1 

- PSHx:                                                                                           

23:36 back; hip; Left;                                                                        vc1 

                                                                                                  

- Immunization history:: Client reports receiving the 2nd dose of the Covid vaccine.              

- Social history:: Smoking status: Patient denies any tobacco usage or history of.                

- Family history:: not pertinent.                                                                 

                                                                                                  

                                                                                                  

ROS:                                                                                              

                                                                                             

02:03 Constitutional: Negative for fever, chills, and weight loss, Cardiovascular: Negative   rt  

      for chest pain, palpitations, and edema, Respiratory: Negative for shortness of breath,     

      cough, wheezing, and pleuritic chest pain, Abdomen/GI: Negative for abdominal pain,         

      nausea, vomiting, diarrhea, and constipation, Skin: Negative for injury, rash, and          

      discoloration, Psych: Negative for depression, anxiety, suicide ideation, homicidal         

      ideation, and hallucinations.                                                               

      Neuro: Positive for altered mental status, loss of consciousness.                           

                                                                                                  

Exam:                                                                                             

02:03 Constitutional:  This is a well developed, well nourished patient who is awake, alert,  rt  

      and in no acute distress. Head/Face:  Normocephalic, atraumatic. Neck:  Trachea             

      midline, no thyromegaly or masses palpated, and no cervical lymphadenopathy.  Supple,       

      full range of motion without nuchal rigidity, or vertebral point tenderness.  No            

      Meningismus. Chest/axilla:  Normal chest wall appearance and motion.  Nontender with no     

      deformity.  No lesions are appreciated. Cardiovascular:  Regular rate and rhythm with a     

      normal S1 and S2.  No gallops, murmurs, or rubs.  Normal PMI, no JVD.  No pulse             

      deficits. Respiratory:  Lungs have equal breath sounds bilaterally, clear to                

      auscultation and percussion.  No rales, rhonchi or wheezes noted.  No increased work of     

      breathing, no retractions or nasal flaring. Abdomen/GI:  Soft, non-tender, with normal      

      bowel sounds.  No distension or tympany.  No guarding or rebound.  No evidence of           

      tenderness throughout. Skin:  Warm, dry with normal turgor.  Normal color with no           

      rashes, no lesions, and no evidence of cellulitis. MS/ Extremity:  Pulses equal, no         

      cyanosis.  Neurovascular intact.  Full, normal range of motion. Neuro:  Awake and           

      alert, GCS 15, oriented to person, place, time, and situation.  Cranial nerves II-XII       

      grossly intact.  Motor strength 5/5 in all extremities.  Sensory grossly intact.            

      Cerebellar exam normal.  Normal gait. Psych:  Awake, alert, with orientation to person,     

      place and time.  Behavior, mood, and affect are within normal limits.                       

02:03 ENT: Dry mucous member.                                                                     

02:03 ECG was reviewed by the Attending Physician.                                            rt  

                                                                                                  

Vital Signs:                                                                                      

                                                                                             

23:32  / 58; Pulse 84; Resp 22; Temp 102.8(Ca); Pulse Ox 96% on R/A; Weight 81.65 kg;   vc1 

                                                                                             

00:47  / 50; Pulse 68; Resp 19; Temp 99.6(Ca); Pulse Ox 93% on R/A;                     kd3 

01:28  / 48; Pulse 64; Resp 19 S; Temp 98.4(Ca); Pulse Ox 94% on R/A;                   kd3 

02:03  / 50; Pulse 65; Resp 19; Temp 97.5(Ca); Pulse Ox 97% on R/A;                     kd3 

                                                                                                  

MDM:                                                                                              

                                                                                             

23:26 Patient medically screened.                                                             rt  

                                                                                             

02:03 Differential Diagnosis Hyperthermia, heatstroke, CVA, sepsis. Data reviewed: vital      rt  

      signs, nurses notes, lab test result(s), EKG, radiologic studies. Consideration of          

      Admission/Observation Patient was admitted/placed on observation. Management of patient     

      was discussed with the following: Hospitalist: Agrees to. I considered the following        

      discharge prescriptions or medication management in the emergency department                

      Medications were administered in the Emergency Department. See MAR. Independent             

      interpretation of the following test(s) in the Emergency Department CT Scan: My             

      interpretation is No hemorrhage seen on my interpretation of CT scan and. Care              

      significantly affected by the following chronic conditions: Diabetes. Counseling: I had     

      a detailed discussion with the patient and/or guardian regarding: the historical            

      points, exam findings, and any diagnostic results supporting the discharge/admit            

      diagnosis, lab results, radiology results, the need for further work-up and treatment       

      in the hospital. Response to treatment: the patient's symptoms have markedly improved       

      after treatment.                                                                            

                                                                                                  

                                                                                             

23:28 Order name: Blood Culture Adult (2)                                                     rt  

                                                                                             

23:28 Order name: CBC with Diff; Complete Time: 00:04                                         rt  

                                                                                             

23:28 Order name: CMP; Complete Time: 00:31                                                   rt  

                                                                                             

23:28 Order name: Lactate w/ 2H reflex if indic.; Complete Time: 00:31                                                                                                                     

23:28 Order name: Protime (+inr); Complete Time: 00:04                                                                                                                                     

23:28 Order name: Ptt, Activated; Complete Time: 00:04                                                                                                                                     

23:28 Order name: Urinalysis w/ reflexes; Complete Time: 00:04                                rt  

                                                                                             

23:28 Order name: Basic Metabolic Panel                                                       rt  

                                                                                             

23:28 Order name: Magnesium; Complete Time: 00:31                                                                                                                                          

23:28 Order name: NT PRO-BNP; Complete Time: 00:31                                                                                                                                         

23:28 Order name: Troponin HS; Complete Time: 00:31                                                                                                                                        

23:28 Order name: CPK; Complete Time: 00:31                                                   rt  

                                                                                             

02:56 Order name: Urinalysis w/ reflexes                                                      EDMS

                                                                                             

02:56 Order name: Magnesium                                                                   EDMS

                                                                                             

02:56 Order name: Urinalysis w/ reflexes                                                      EDMS

                                                                                             

02:56 Order name: Basic Metabolic Panel                                                       EDMS

                                                                                             

02:56 Order name: Basic Metabolic Panel                                                       EDMS

                                                                                             

02:56 Order name: Basic Metabolic Panel                                                       EDMS

                                                                                             

02:56 Order name: CBC with Automated Diff                                                     EDMS

                                                                                             

02:56 Order name: CBC with Automated Diff                                                     EDMS

                                                                                             

02:56 Order name: CBC with Automated Diff                                                     EDMS

                                                                                             

23:28 Order name: Chest Single View XRAY                                                      rt  

                                                                                             

23:28 Order name: CT Head Brain wo Cont                                                       rt  

                                                                                             

23:28 Order name: EKG; Complete Time: 23:29                                                   rt  

                                                                                             

02:55 Order name: 60g Consistent Carbohydrate (ADA 1800/2000)                                 EDMS

                                                                                             

23:28 Order name: Accucheck; Complete Time: 01:08                                             rt  

                                                                                             

23:28 Order name: Cardiac monitoring; Complete Time: 00:48                                    rt  

                                                                                             

23:28 Order name: EKG - Nurse/Tech; Complete Time: 00:48                                      rt  

                                                                                             

23:28 Order name: IV Saline Lock - Large Bore; Complete Time: 00:48                           rt  

                                                                                             

23:28 Order name: Labs collected and sent; Complete Time: 00:48                               rt  

                                                                                             

23:28 Order name: O2 Per Protocol; Complete Time: 00:48                                       rt  

                                                                                             

23:28 Order name: O2 Sat Monitoring; Complete Time: 00:48                                     rt  

                                                                                             

23:28 Order name: Vital Signs; Complete Time: 00:48                                           rt  

                                                                                             

23:28 Order name: IV Saline Lock; Complete Time: 00:24                                        rt  

                                                                                                  

EC:03 Rate is 72 beats/min. Rhythm is regular, Sinus Rhythm with Occasional PVCs. QRS Axis is rt  

      Normal. OR interval is normal. QRS interval is normal. QT interval is normal. No Q          

      waves.                                                                                      

                                                                                                  

Administered Medications:                                                                         

00:46 Drug: NS 0.9% IV 1000 ml Route: IV; Rate: 1 bolus; Site: left forearm;                  kd3 

01:28 Follow up: IV Status: Completed infusion; IV Intake: 1000ml                             kd3 

01:08 Drug: Acetaminophen PO 1000 mg Route: PO;                                               kd3 

01:08 Drug: Cefepime IVPB 2 grams Route: IVPB; Rate: 200 ml/hr; Infused Over: 30 mins; Site:  [facility] 

      left forearm;                                                                               

02:24 Follow up: IV Status: Completed infusion                                                kd3 

                                                                                                  

                                                                                                  

Disposition Summary:                                                                              

23 02:10                                                                                    

Hospitalization Ordered                                                                           

      Hospitalization Status: Inpatient Admission                                             rt  

      Provider: Thania Bronson                                                                rt  

      Condition: Fair                                                                         rt  

      Problem: new                                                                            rt  

      Symptoms: have improved                                                                 rt  

      Bed/Room Type: Standard                                                                 rt  

      Location: Telemetry/MedSurg (Inpatient)(23 04:16)                                 vc1 

      Room Assignment: 230(23 04:16)                                                    vc1 

      Diagnosis                                                                                   

        - Hyperthermia                                                                        rt  

        - Altered mental status                                                               rt  

      Forms:                                                                                      

        - Medication Reconciliation Form                                                      rt  

        - SBAR form                                                                           rt  

Critical care time excluding procedures:                                                          

02:09 Critical care time: Bedside Care: 30 minutes, Consultation: 5 minutes. Total time: 35   rt  

      minutes                                                                                     

                                                                                                  

Signatures:                                                                                       

Dispatcher [employer]                           EDMS                                                 

Ghada Hernandez RN RN                                                      

Corinne Christian RN                      RN   kd3                                                  

Caity Yu RN                    RN   vc1                                                  

Ang Suggs MD MD   rt                                                   

                                                                                                  

Corrections: (The following items were deleted from the chart)                                    

02:19 02:10 Telemetry/MedSurg (observation) rt                                                mw  

02:19 02:10 rt                                                                                  

04:16 02:19 [facility] ER HOLD mw                                                                   1 

04:16 02:19 ERHOLD- mw                                                                        1 

                                                                                                  

**************************************************************************************************

## 2023-07-29 NOTE — ER
Nurse's Notes                                                                                     

 [facility]                                                                 

Name: Canelo Simon                                                                             

Age: 82 yrs                                                                                       

Sex: Male                                                                                         

: 1940                                                                                   

MRN: [id number]                                                                                   

Arrival Date: 2023                                                                          

Time: 23:14                                                                                       

Account#: [id number]                                                                            

Bed 8                                                                                             

Private MD:                                                                                       

Diagnosis: Hyperthermia;Altered mental status                                                     

                                                                                                  

Presentation:                                                                                     

                                                                                             

23:32 Chief complaint: EMS states: Pt was found unresponsive with an axillary temp of 105.8   vc1 

      with a BGL of 314. There ac went out and it was 95 degrees in the house. Oxygen was 82%     

      on arrival and 96% once placed on non rebreather. Coronavirus screen: Vaccine status:       

      Patient reports receiving the 2nd dose of the covid vaccine. Unsure  Client     

      denies travel out of the [place] in the last 14 days. At this time, the client does not        

      indicate any symptoms associated with coronavirus-19. Ebola Screen: Patient negative        

      for fever greater than or equal to 101.5 degrees Fahrenheit, and additional compatible      

      Ebola Virus Disease symptoms Patient denies exposure to infectious person. Patient          

      denies travel to an Ebola-affected area in the 21 days before illness onset. No             

      symptoms or risks identified at this time. Initial Sepsis Screen: Does the patient meet     

      any 2 criteria? No. Patient's initial sepsis screen is negative. Does the patient have      

      a suspected source of infection? No. Patient's initial sepsis screen is negative. Risk      

      Assessment: Do you want to hurt yourself or someone else? Patient reports no desire to      

      harm self or others. Onset of symptoms was 2023.                                   

23:32 Method Of Arrival: EMS: [facility]                                                       vc1 

23:32 Acuity: HANANE 2                                                                           vc1 

                                                                                                  

Triage Assessment:                                                                                

23:36 General: Appears distressed, uncomfortable, ill, Behavior is cooperative. Pain:         vc1 

      Complains of pain in back. EENT: No deficits noted. No signs and/or symptoms were           

      reported regarding the EENT system. Neuro: Level of Consciousness is obeys commands,        

      lethargic, Oriented to person, place. Cardiovascular: No deficits noted. Respiratory:       

      Airway is patent Respiratory effort is even, unlabored, Respiratory pattern is regular,     

      symmetrical. GI: No deficits noted. No signs and/or symptoms were reported involving        

      the gastrointestinal system. : No deficits noted. No signs and/or symptoms were           

      reported regarding the genitourinary system. Derm: Skin temperature is hot.                 

      Musculoskeletal: No deficits noted. No signs and/or symptoms reported regarding the         

      musculoskeletal system.                                                                     

                                                                                                  

Historical:                                                                                       

- Allergies:                                                                                      

23:36 PENICILLINS;                                                                            vc1 

- PMHx:                                                                                           

23:36 coronary atherosclerosis; Diabetes - NIDDM; Hypertension;                               vc1 

- PSHx:                                                                                           

23:36 back; hip; Left;                                                                        vc1 

                                                                                                  

- Immunization history:: Client reports receiving the 2nd dose of the Covid vaccine.              

- Social history:: Smoking status: Patient denies any tobacco usage or history of.                

- Family history:: not pertinent.                                                                 

                                                                                                  

                                                                                                  

Screenin/29                                                                                             

00:48 [facility] ED Fall Risk Assessment (Adult) History of falling in the last 3 months,       kd3 

      including since admission No falls in past 3 months (0 pts) Confusion or Disorientation     

      Yes (5 pts) Intoxicated or Sedated No (0 pts) Impaired Gait No (0 pts) Mobility Assist      

      Device Used No (0 pt) Altered Elimination No (0 pt) Score/Fall Risk Level 3 or more         

      points = High Risk Maintained a safe environment, Hourly rounding (assess needs \T\ fall    

      precautionary measures) done. Abuse screen: Denies threats or abuse. Denies injuries        

      from another. Nutritional screening: No deficits noted. Tuberculosis screening: No          

      symptoms or risk factors identified.                                                        

                                                                                                  

Assessment:                                                                                       

00:47 General: Appears ill, Behavior is calm, cooperative. Pain: Denies pain. Neuro: Level of kd3 

      Consciousness is awake, obeys commands, lethargic.                                          

02:23 General: Appears in no apparent distress. Behavior is calm, cooperative. Neuro: Level   kd3 

      of Consciousness is awake, obeys commands, lethargic, Oriented to person, place.            

      Respiratory: Airway is patent Trachea midline Respiratory effort is even, unlabored,        

      Respiratory pattern is regular, symmetrical. GI: Abdomen is non-distended.                  

                                                                                                  

Vital Signs:                                                                                      

                                                                                             

23:32  / 58; Pulse 84; Resp 22; Temp 102.8(Ca); Pulse Ox 96% on R/A; Weight 81.65 kg;   vc1 

                                                                                             

00:47  / 50; Pulse 68; Resp 19; Temp 99.6(Ca); Pulse Ox 93% on R/A;                     kd3 

01:28  / 48; Pulse 64; Resp 19 S; Temp 98.4(Ca); Pulse Ox 94% on R/A;                   kd3 

02:03  / 50; Pulse 65; Resp 19; Temp 97.5(Ca); Pulse Ox 97% on R/A;                     kd3 

                                                                                                  

ED Course:                                                                                        

                                                                                             

23:24 Patient arrived in ED.                                                                  vc1 

23:26 Ang Suggs MD is Attending Physician.                                            rt  

23:32 Corinne Christian RN is Primary Nurse.                                                    kd3 

23:36 Triage completed.                                                                       vc1 

23:36 Arm band placed on right wrist.                                                         vc1 

                                                                                             

00:00 Chest Single View XRAY In Process Unspecified.                                          EDMS

00:48 Basic Metabolic Panel Sent.                                                             kd3 

00:48 Blood Culture Adult (2) Sent.                                                           kd3 

01:21 CT Head Brain wo Cont In Process Unspecified.                                           EDMS

02:10 Thania Bronson MD is Hospitalizing Provider.                                           rt  

02:23 Patient has correct armband on for positive identification. Placed in gown. Provided    kd3 

      Education on: .                                                                             

06:03 No provider procedures requiring assistance completed. Patient admitted, IV remains in  kd3 

      place.                                                                                      

                                                                                                  

Administered Medications:                                                                         

00:46 Drug: NS 0.9% IV 1000 ml Route: IV; Rate: 1 bolus; Site: left forearm;                  kd3 

01:28 Follow up: IV Status: Completed infusion; IV Intake: 1000ml                             kd3 

01:08 Drug: Acetaminophen PO 1000 mg Route: PO;                                               kd3 

01:08 Drug: Cefepime IVPB 2 grams Route: IVPB; Rate: 200 ml/hr; Infused Over: 30 mins; Site:  kd3 

      left forearm;                                                                               

02:24 Follow up: IV Status: Completed infusion                                                kd3 

                                                                                                  

                                                                                                  

Medication:                                                                                       

02:24 VIS not applicable for this client.                                                     kd3 

                                                                                                  

Intake:                                                                                           

01:28 IV: 1000ml; Total: 1000ml.                                                              kd3 

                                                                                                  

Output:                                                                                           

01:27 Urine: 600ml (Moss); Total: 600ml.                                                     kd3 

                                                                                                  

Outcome:                                                                                          

02:10 Decision to Hospitalize by Provider.                                                    rt  

06:03 Admitted to Med/surg                                                                    kd3 

06:03 Condition: stable                                                                           

06:03 Discharge instructions given to patient, Instructed on the need for admit.                  

06:03 Patient left the ED.                                                                    kd3 

                                                                                                  

Signatures:                                                                                       

Dispatcher MedHo                           EDMS                                                 

Corinne Christian RN RN   kd3                                                  

Caity Yu RN RN   vc1                                                  

Ang Suggs MD MD   rt                                                   

                                                                                                  

**************************************************************************************************

## 2023-07-30 LAB
ALBUMIN SERPL BCP-MCNC: 2.6 G/DL (ref 3.4–5)
ALP SERPL-CCNC: 57 U/L (ref 45–117)
ALT SERPL W P-5'-P-CCNC: 19 U/L (ref 16–61)
AST SERPL W P-5'-P-CCNC: 16 U/L (ref 15–37)
BUN BLD-MCNC: 27 MG/DL (ref 7–18)
CRP SERPL-MCNC: 4.5 MG/L (ref ?–3)
GLUCOSE SERPLBLD-MCNC: 221 MG/DL (ref 74–106)
HCT VFR BLD CALC: 32.5 % (ref 39.6–49)
LYMPHOCYTES # SPEC AUTO: 1.3 K/UL (ref 0.7–4.9)
MAGNESIUM SERPL-MCNC: 1.8 MG/DL (ref 1.6–2.4)
MCV RBC: 90.7 FL (ref 80–100)
PMV BLD: 9.4 FL (ref 7.6–11.3)
POTASSIUM SERPL-SCNC: 4.3 MEQ/L (ref 3.5–5.1)
RBC # BLD: 3.58 M/UL (ref 4.33–5.43)
TROPONIN I SERPL HS-MCNC: 131.8 PG/ML (ref ?–58.9)
TSH SERPL DL<=0.05 MIU/L-ACNC: 0.72 UIU/ML (ref 0.36–3.74)

## 2023-07-30 RX ADMIN — HYDRALAZINE HYDROCHLORIDE PRN MG: 20 INJECTION INTRAMUSCULAR; INTRAVENOUS at 01:31

## 2023-07-30 RX ADMIN — HEPARIN SODIUM SCH UNIT: 5000 INJECTION, SOLUTION INTRAVENOUS; SUBCUTANEOUS at 16:38

## 2023-07-30 RX ADMIN — HUMAN INSULIN SCH: 100 INJECTION, SOLUTION SUBCUTANEOUS at 07:30

## 2023-07-30 RX ADMIN — SODIUM CHLORIDE SCH MLS: 0.9 INJECTION, SOLUTION INTRAVENOUS at 19:26

## 2023-07-30 RX ADMIN — Medication SCH ML: at 21:24

## 2023-07-30 RX ADMIN — Medication SCH ML: at 21:23

## 2023-07-30 RX ADMIN — HYDRALAZINE HYDROCHLORIDE PRN MG: 20 INJECTION INTRAMUSCULAR; INTRAVENOUS at 12:18

## 2023-07-30 RX ADMIN — SODIUM CHLORIDE SCH MLS: 0.9 INJECTION, SOLUTION INTRAVENOUS at 01:30

## 2023-07-30 RX ADMIN — GABAPENTIN SCH MG: 300 CAPSULE ORAL at 21:20

## 2023-07-30 RX ADMIN — PANTOPRAZOLE SODIUM SCH MG: 40 TABLET, DELAYED RELEASE ORAL at 16:32

## 2023-07-30 RX ADMIN — HYDRALAZINE HYDROCHLORIDE SCH MG: 10 TABLET, FILM COATED ORAL at 21:33

## 2023-07-30 RX ADMIN — HUMAN INSULIN SCH UNIT: 100 INJECTION, SOLUTION SUBCUTANEOUS at 21:34

## 2023-07-30 RX ADMIN — HYDRALAZINE HYDROCHLORIDE SCH MG: 25 TABLET, FILM COATED ORAL at 08:42

## 2023-07-30 RX ADMIN — HEPARIN SODIUM SCH UNIT: 5000 INJECTION, SOLUTION INTRAVENOUS; SUBCUTANEOUS at 08:50

## 2023-07-30 RX ADMIN — ATORVASTATIN CALCIUM SCH MG: 10 TABLET, FILM COATED ORAL at 21:20

## 2023-07-30 RX ADMIN — HYDRALAZINE HYDROCHLORIDE SCH MG: 25 TABLET, FILM COATED ORAL at 13:52

## 2023-07-30 RX ADMIN — HYDRALAZINE HYDROCHLORIDE PRN MG: 20 INJECTION INTRAMUSCULAR; INTRAVENOUS at 21:35

## 2023-07-30 RX ADMIN — HUMAN INSULIN SCH: 100 INJECTION, SOLUTION SUBCUTANEOUS at 15:48

## 2023-07-30 RX ADMIN — HUMAN INSULIN SCH UNIT: 100 INJECTION, SOLUTION SUBCUTANEOUS at 12:18

## 2023-07-30 RX ADMIN — SODIUM CHLORIDE SCH MLS: 0.9 INJECTION, SOLUTION INTRAVENOUS at 08:42

## 2023-07-30 RX ADMIN — HEPARIN SODIUM SCH UNIT: 5000 INJECTION, SOLUTION INTRAVENOUS; SUBCUTANEOUS at 01:30

## 2023-07-30 RX ADMIN — Medication SCH ML: at 08:42

## 2023-07-30 NOTE — RAD REPORT
EXAM DESCRIPTION:

CT Head Without Intravenous Contrast

 

CLINICAL HISTORY:  Ams

 

TECHNIQUE:  Axial computed tomography images of the head/brain without intravenous contrast.   Sagitt
al and coronal reformatted images were created and reviewed.   This CT exam was performed using one o
r more of the following dose reduction techniques:   automated exposure control, adjustment of the mA
 and/or kV according to patient size, and/or use of iterative reconstruction technique.

 

COMPARISON:  No relevant prior studies available.

 

FINDINGS:  Brain:   Mild cerebral atrophy.   No hemorrhage.   No significant white matter disease.

Ventricles:   Unremarkable.   No ventriculomegaly.

Bones/joints:   Unremarkable.   No acute fracture.

Soft tissues:   Unremarkable.

Vasculature:   There is atherosclerotic disease of the internal carotid and vertebral arteries bilate
rally.

Sinuses:   Unremarkable as visualized.   No acute sinusitis.

Mastoid air cells:   Unremarkable as visualized.   No mastoid effusion.

 

IMPRESSION:  1.   No acute hemorrhage, focal mass or large territory infarction.

2.   Other findings as above.

 

Electronically signed by:   Beth Willett MD   7/29/2023 1:52 AM CDT Workstation: [id number]

 

 

 

Due to temporary technical issues with the PACS/Fluency reporting system, reports are being signed by
 the in house radiologists without review as a courtesy to insure prompt reporting. The interpreting 
radiologist is fully responsible for the content of the report.

## 2023-07-30 NOTE — EKG
Test Date:    2023-07-29               Test Time:    00:32:54

Technician:   DEANDRE                                     

                                                     

MEASUREMENT RESULTS:                                       

Intervals:                                           

Rate:         72                                     

MA:           222                                    

QRSD:         124                                    

QT:           426                                    

QTc:          466                                    

Axis:                                                

P:            55                                     

MA:           222                                    

QRS:          -54                                    

T:            75                                     

                                                     

INTERPRETIVE STATEMENTS:                                       

                                                     

Sinus rhythm with 1st degree AV block with premature supraventricular

complexes

Left anterior fascicular block

Left ventricular hypertrophy with QRS widening

Abnormal ECG

Compared to ECG 07/20/2023 16:06:41

Atrial premature complex(es) now present

Left anterior fascicular block now present

Sinus bradycardia no longer present

Left-axis deviation no longer present

Early repolarization no longer present



Electronically Signed On 07-30-23 13:11:15 CDT by Darrell Campbell

## 2023-07-30 NOTE — RAD REPORT
EXAM DESCRIPTION:  RAD - Chest Single View - 7/30/2023 6:32 am

 

CLINICAL HISTORY:  pneumonia

 

COMPARISON:  <Comparisons>

 

FINDINGS:  Lines: None.

Lungs: Enlarging consolidative process in left mid lung.

Pleural: No significant pleural effusions or pneumothorax.

Cardiac: The heart size is within normal limits.

Mediastinum: Within normal limits.

Bones: No acute fractures.

Other: None

 

IMPRESSION:  Left mid lung consolidative process has increased in size since 07/20/2023. The short-te
rm change would suggest pneumonia. Consider chest CT for further characterization.

## 2023-07-30 NOTE — RAD REPORT
EXAM DESCRIPTION:  Chest Single View

 

CLINICAL HISTORY:  82-year-old male with fever.

 

TECHNIQUE:  Single view, AP portable chest was obtained.

 

COMPARISON:  None.

 

FINDINGS:  Unremarkable cardiac and mediastinal silhouette. Heart size is normal. Tortuous atheroscle
rotic thoracic aorta.

Low lung volumes grossly clear without focal opacity, pneumothorax or pleural effusions.   The visual
ized bones are within normal limits.

 

IMPRESSION:  No acute cardiopulmonary abnormalities.

 

Electronically signed by:   Shira Bermeo MD   7/29/2023 12:23 AM CDT Workstation: [id number]

 

 

 

Due to temporary technical issues with the PACS/Fluency reporting system, reports are being signed by
 the in house radiologists without review as a courtesy to insure prompt reporting. The interpreting 
radiologist is fully responsible for the content of the report.

## 2023-07-31 VITALS — DIASTOLIC BLOOD PRESSURE: 67 MMHG | TEMPERATURE: 98.1 F | SYSTOLIC BLOOD PRESSURE: 147 MMHG

## 2023-07-31 VITALS — OXYGEN SATURATION: 96 %

## 2023-07-31 LAB
ALBUMIN SERPL BCP-MCNC: 2.4 G/DL (ref 3.4–5)
ALP SERPL-CCNC: 50 U/L (ref 45–117)
ALT SERPL W P-5'-P-CCNC: 17 U/L (ref 16–61)
AST SERPL W P-5'-P-CCNC: 14 U/L (ref 15–37)
BUN BLD-MCNC: 23 MG/DL (ref 7–18)
GLUCOSE SERPLBLD-MCNC: 237 MG/DL (ref 74–106)
HCT VFR BLD CALC: 31.3 % (ref 39.6–49)
IRON SERPL-MCNC: 112 UG/DL (ref 65–175)
LYMPHOCYTES # SPEC AUTO: 1.7 K/UL (ref 0.7–4.9)
MAGNESIUM SERPL-MCNC: 1.8 MG/DL (ref 1.6–2.4)
MCV RBC: 91 FL (ref 80–100)
PMV BLD: 9.1 FL (ref 7.6–11.3)
POTASSIUM SERPL-SCNC: 4.5 MEQ/L (ref 3.5–5.1)
RBC # BLD: 3.44 M/UL (ref 4.33–5.43)
TROPONIN I SERPL HS-MCNC: 114.5 PG/ML (ref ?–58.9)

## 2023-07-31 RX ADMIN — SODIUM CHLORIDE SCH MLS: 0.9 INJECTION, SOLUTION INTRAVENOUS at 12:01

## 2023-07-31 RX ADMIN — HEPARIN SODIUM SCH UNIT: 5000 INJECTION, SOLUTION INTRAVENOUS; SUBCUTANEOUS at 08:27

## 2023-07-31 RX ADMIN — HYDRALAZINE HYDROCHLORIDE PRN MG: 20 INJECTION INTRAMUSCULAR; INTRAVENOUS at 08:05

## 2023-07-31 RX ADMIN — HYDRALAZINE HYDROCHLORIDE SCH MG: 10 TABLET, FILM COATED ORAL at 19:56

## 2023-07-31 RX ADMIN — Medication SCH: at 19:58

## 2023-07-31 RX ADMIN — Medication SCH ML: at 08:05

## 2023-07-31 RX ADMIN — PANTOPRAZOLE SODIUM SCH MG: 40 TABLET, DELAYED RELEASE ORAL at 08:05

## 2023-07-31 RX ADMIN — HUMAN INSULIN SCH: 100 INJECTION, SOLUTION SUBCUTANEOUS at 07:30

## 2023-07-31 RX ADMIN — ATORVASTATIN CALCIUM SCH MG: 10 TABLET, FILM COATED ORAL at 19:57

## 2023-07-31 RX ADMIN — HUMAN INSULIN SCH: 100 INJECTION, SOLUTION SUBCUTANEOUS at 16:30

## 2023-07-31 RX ADMIN — GABAPENTIN SCH MG: 300 CAPSULE ORAL at 19:57

## 2023-07-31 RX ADMIN — Medication SCH ML: at 19:58

## 2023-07-31 RX ADMIN — SODIUM CHLORIDE SCH MLS: 0.9 INJECTION, SOLUTION INTRAVENOUS at 04:57

## 2023-07-31 RX ADMIN — HEPARIN SODIUM SCH UNIT: 5000 INJECTION, SOLUTION INTRAVENOUS; SUBCUTANEOUS at 00:27

## 2023-07-31 RX ADMIN — HEPARIN SODIUM SCH UNIT: 5000 INJECTION, SOLUTION INTRAVENOUS; SUBCUTANEOUS at 17:37

## 2023-07-31 RX ADMIN — HUMAN INSULIN SCH UNIT: 100 INJECTION, SOLUTION SUBCUTANEOUS at 19:57

## 2023-07-31 RX ADMIN — HUMAN INSULIN SCH UNIT: 100 INJECTION, SOLUTION SUBCUTANEOUS at 12:01

## 2023-07-31 NOTE — P.PN
Subjective


Date of Service: 07/30/23





Conversation with family.  They are going to be able to take patient home 

tomorrow.  The patient's daughter lives with him and her children are there at 

that time as well.  He was at home for a little while by himself and he appeared

to not turn the air conditioner on so he got overheated and brought to the 

hospital where he was initially lethargic but he came around very quickly.  

Work-up is unremarkable.  Patient may be able to go home in a.m. pending 

additional work-up.





Review of Systems


10-point ROS is otherwise unremarkable





Physical Examination





- Vital Signs


Temperature: 98.6 F


Blood Pressure: 220/90


Pulse: 67


Respirations: 14


Pulse Ox (%): 96





- Physical Exam


General: Alert, In no apparent distress, Oriented x2


HEENT: Atraumatic, PERRLA, EOMI


Neck: Supple, JVD not distended


Respiratory: Clear to auscultation bilaterally, Normal air movement


Cardiovascular: Regular rate/rhythm, Normal S1 S2, No murmurs


Gastrointestinal: Normal bowel sounds, Soft and benign, Non-distended, No 

tenderness


Musculoskeletal: No tenderness


Integumentary: No rashes


Neurological: Cranial nerves 3-12 intact, Abnormal gait, Abnormal speech, 

Abnormal strength





- Studies


Laboratory Data (last 24 hrs)











  07/31/23 07/31/23 07/31/23





  06:51 06:51 05:00


 


WBC   8.70 


 


Hgb   10.4 L 


 


Hct   31.3 L 


 


Plt Count   142 L 


 


Sodium  136   Cancelled


 


Potassium  4.5   Cancelled


 


BUN  23 H   Cancelled


 


Creatinine  0.85   Cancelled


 


Glucose  237 H   Cancelled


 


Magnesium  1.8   Cancelled


 


Total Bilirubin  0.3  


 


AST  14 L  


 


ALT  17  


 


Alkaline Phosphatase  50  








Medications List Reviewed: Yes





Assessment & Plan





- Problems (Diagnosis)


(1) Generalized weakness


Current Visit: Yes   Status: Acute   





(2) Altered mental status


Current Visit: Yes   Status: Acute   





(3) Heat exhaustion


Current Visit: Yes   Status: Acute   





(4) Elevated troponin


Current Visit: Yes   Status: Acute   





(5) Hypertension


Current Visit: Yes   Status: Acute   





(6) Anemia


Current Visit: Yes   Status: Acute   





- Plan





Plan:


1.  Continue with cardiac meds


2.  Antiplatelet and statin therapy


3.  Strict blood pressure control


4.  IV hydration


5.  Monitor labs


6.  GI DVT prophylaxis


Discharge Plan: Home


Plan to discharge in: Greater than 2 days





- Advance Directives


Does patient have a Living Will: No


Does patient have a Durable POA for Healthcare: No





- Code Status/Comfort Care


Code Status: Full Code


Physician Review: Patient Assessed, Agree with Above Assessment and Plan


Critical Care: No


Time Spent Managing PTS Care (In Minutes): 35

## 2023-08-01 NOTE — ECHO
HEIGHT: 5 ft 11 in   WEIGHT: 172 lb 8 oz   DATE OF STUDY: 07/31/2023   REFER DR: Thania Bronson MD

2-DIMENSIONAL: YES

     M.MODE: YES

 DOPPLER: YES

COLOR FLOW: YES



                    TDS:  NO

PORTABLE: YES

 DEFINITY:  NO

BUBBLE STUDY: NO





DIAGNOSIS:  ELEVATED TROPONIN



CARDIAC HISTORY:  

CATHERIZATION:YES

SURGERY: NO

PROSTHETIC VALVE: NO

PACEMAKER: NO





MEASUREMENTS (cm)

    DIASTOLIC (NORMALS)                 SYSTOLIC (NORMALS)

IVSd                 1.1 (0.6-1.2)                    LA Diam 3.0 (1.9-4.0)     LVEF       
  62%  

LVIDd               3.5 (3.5-5.7)                        LVIDs      2.4 (2.0-3.5)     %FS  
        32%

LVPWd             1.1 (0.6-1.2)

Ao Diam           3.1 (2.0-3.7)



2 DIMENSIONAL ASSESSMENT:

RIGHT ATRIUM:                   NORMAL

LEFT ATRIUM:       NORMAL



RIGHT VENTRICLE:            NORMAL

LEFT VENTRICLE: NORMAL



TRICUSPID VALVE:             NORMAL

MITRAL VALVE:     MILD MR



PULMONIC VALVE:             NORMAL

AORTIC VALVE:     MILD AI



PERICARDIAL EFFUSION: NONE

AORTIC ROOT:      NORMAL





LEFT VENTRICULAR WALL MOTION:     NORMAL



DOPPLER/COLOR FLOW:     

  1. MILD MITRAL REGURGITATION.

  2. MILD AORTIC REGURGITATION.



COMMENTS:      

  1. NORMAL LEFT VENTRICULAR EJECTION FRACTION 55-60% WITH NORMAL WALL MOTION.

  2. MILD MITRAL REGURGITATION.

  3. MILD AORTIC REGURGITATION.



TECHNOLOGIST:   MARIA FERNANDA BALDERAS

## 2024-08-23 ENCOUNTER — HOSPITAL ENCOUNTER (INPATIENT)
Dept: HOSPITAL 97 - ER | Age: 84
LOS: 4 days | Discharge: HOME | DRG: 280 | End: 2024-08-27
Attending: HOSPITALIST | Admitting: HOSPITALIST
Payer: OTHER GOVERNMENT

## 2024-08-23 VITALS — BODY MASS INDEX: 27.3 KG/M2

## 2024-08-23 DIAGNOSIS — I50.33: ICD-10-CM

## 2024-08-23 DIAGNOSIS — Z88.0: ICD-10-CM

## 2024-08-23 DIAGNOSIS — R29.6: ICD-10-CM

## 2024-08-23 DIAGNOSIS — Z87.891: ICD-10-CM

## 2024-08-23 DIAGNOSIS — E78.5: ICD-10-CM

## 2024-08-23 DIAGNOSIS — Z79.82: ICD-10-CM

## 2024-08-23 DIAGNOSIS — E11.9: ICD-10-CM

## 2024-08-23 DIAGNOSIS — I11.0: Primary | ICD-10-CM

## 2024-08-23 DIAGNOSIS — G89.29: ICD-10-CM

## 2024-08-23 DIAGNOSIS — Z79.84: ICD-10-CM

## 2024-08-23 DIAGNOSIS — Z79.890: ICD-10-CM

## 2024-08-23 DIAGNOSIS — J44.0: ICD-10-CM

## 2024-08-23 DIAGNOSIS — Z91.81: ICD-10-CM

## 2024-08-23 DIAGNOSIS — I25.10: ICD-10-CM

## 2024-08-23 DIAGNOSIS — Z79.52: ICD-10-CM

## 2024-08-23 DIAGNOSIS — J15.9: ICD-10-CM

## 2024-08-23 DIAGNOSIS — Z79.899: ICD-10-CM

## 2024-08-23 DIAGNOSIS — J96.01: ICD-10-CM

## 2024-08-23 DIAGNOSIS — I21.A1: ICD-10-CM

## 2024-08-23 DIAGNOSIS — K59.09: ICD-10-CM

## 2024-08-23 LAB
ALBUMIN SERPL BCP-MCNC: 2.3 G/DL (ref 3.4–5)
ALBUMIN/GLOB SERPL: 0.5 {RATIO} (ref 1.1–1.8)
ALP SERPL-CCNC: 61 U/L (ref 45–117)
ALT SERPL W P-5'-P-CCNC: 21 U/L (ref 16–61)
ANION GAP SERPL CALC-SCNC: 7.1 MEQ/L (ref 5–15)
AST SERPL W P-5'-P-CCNC: 24 U/L (ref 15–37)
BILIRUB INDIRECT SERPL-MCNC: 0.1 MG/DL (ref 0.2–0.8)
BUN BLD-MCNC: 18 MG/DL (ref 7–18)
GLOBULIN SER CALC-MCNC: 4.6 G/DL (ref 2.3–3.5)
GLUCOSE SERPLBLD-MCNC: 167 MG/DL (ref 74–106)
HCT VFR BLD CALC: 28 % (ref 39.6–49)
HGB BLD-MCNC: 8.9 G/DL (ref 13.6–17.9)
LYMPHOCYTES # SPEC AUTO: 0.9 K/UL (ref 0.7–4.9)
MAGNESIUM SERPL-MCNC: 1.7 MG/DL (ref 1.6–2.4)
MCH RBC QN AUTO: 29.7 PG (ref 27–35)
MCHC RBC AUTO-ENTMCNC: 31.8 G/DL (ref 32–36)
MCV RBC: 93.4 FL (ref 80–100)
NRBC # BLD: 0 10*3/UL (ref 0–0)
NRBC BLD AUTO-RTO: 0 % (ref 0–0)
NT-PROBNP SERPL-MCNC: (no result) PG/ML (ref ?–450)
PMV BLD: 8 FL (ref 7.6–11.3)
POTASSIUM SERPL-SCNC: 5.1 MEQ/L (ref 3.5–5.1)
RBC # BLD: 3 M/UL (ref 4.33–5.43)
TROPONIN I SERPL HS-MCNC: 61.5 PG/ML (ref ?–58.9)
UA COMPLETE W REFLEX CULTURE PNL UR: (no result)
UA DIPSTICK W REFLEX MICRO PNL UR: (no result)
WBC # BLD AUTO: 10.2 THOU/UL (ref 4.3–10.9)

## 2024-08-23 PROCEDURE — 99285 EMERGENCY DEPT VISIT HI MDM: CPT

## 2024-08-23 PROCEDURE — 83880 ASSAY OF NATRIURETIC PEPTIDE: CPT

## 2024-08-23 PROCEDURE — 96366 THER/PROPH/DIAG IV INF ADDON: CPT

## 2024-08-23 PROCEDURE — 96365 THER/PROPH/DIAG IV INF INIT: CPT

## 2024-08-23 PROCEDURE — 84484 ASSAY OF TROPONIN QUANT: CPT

## 2024-08-23 PROCEDURE — 97112 NEUROMUSCULAR REEDUCATION: CPT

## 2024-08-23 PROCEDURE — 85730 THROMBOPLASTIN TIME PARTIAL: CPT

## 2024-08-23 PROCEDURE — 85610 PROTHROMBIN TIME: CPT

## 2024-08-23 PROCEDURE — 82947 ASSAY GLUCOSE BLOOD QUANT: CPT

## 2024-08-23 PROCEDURE — 93005 ELECTROCARDIOGRAM TRACING: CPT

## 2024-08-23 PROCEDURE — 80061 LIPID PANEL: CPT

## 2024-08-23 PROCEDURE — 71275 CT ANGIOGRAPHY CHEST: CPT

## 2024-08-23 PROCEDURE — 83735 ASSAY OF MAGNESIUM: CPT

## 2024-08-23 PROCEDURE — 71046 X-RAY EXAM CHEST 2 VIEWS: CPT

## 2024-08-23 PROCEDURE — 81001 URINALYSIS AUTO W/SCOPE: CPT

## 2024-08-23 PROCEDURE — 94760 N-INVAS EAR/PLS OXIMETRY 1: CPT

## 2024-08-23 PROCEDURE — 97116 GAIT TRAINING THERAPY: CPT

## 2024-08-23 PROCEDURE — 80076 HEPATIC FUNCTION PANEL: CPT

## 2024-08-23 PROCEDURE — 97530 THERAPEUTIC ACTIVITIES: CPT

## 2024-08-23 PROCEDURE — 85025 COMPLETE CBC W/AUTO DIFF WBC: CPT

## 2024-08-23 PROCEDURE — 80048 BASIC METABOLIC PNL TOTAL CA: CPT

## 2024-08-23 PROCEDURE — 36415 COLL VENOUS BLD VENIPUNCTURE: CPT

## 2024-08-23 PROCEDURE — 97161 PT EVAL LOW COMPLEX 20 MIN: CPT

## 2024-08-23 PROCEDURE — 71045 X-RAY EXAM CHEST 1 VIEW: CPT

## 2024-08-23 PROCEDURE — 93306 TTE W/DOPPLER COMPLETE: CPT

## 2024-08-23 PROCEDURE — 96375 TX/PRO/DX INJ NEW DRUG ADDON: CPT

## 2024-08-23 RX ADMIN — BISACODYL ONE MG: 5 TABLET, COATED ORAL at 16:24

## 2024-08-23 RX ADMIN — LEVALBUTEROL HYDROCHLORIDE ONE: 0.63 SOLUTION RESPIRATORY (INHALATION) at 15:29

## 2024-08-23 RX ADMIN — HUMAN INSULIN SCH UNIT: 100 INJECTION, SOLUTION SUBCUTANEOUS at 16:39

## 2024-08-23 RX ADMIN — IPRATROPIUM BROMIDE ONE: 0.5 SOLUTION RESPIRATORY (INHALATION) at 15:29

## 2024-08-23 RX ADMIN — ATORVASTATIN CALCIUM SCH MG: 40 TABLET, FILM COATED ORAL at 20:25

## 2024-08-23 RX ADMIN — ENOXAPARIN SODIUM SCH MG: 100 INJECTION SUBCUTANEOUS at 16:24

## 2024-08-23 RX ADMIN — ALBUMIN (HUMAN) SCH MG: 5 SOLUTION INTRAVENOUS at 16:25

## 2024-08-23 RX ADMIN — ALPRAZOLAM PRN MG: 0.25 TABLET ORAL at 20:25

## 2024-08-23 RX ADMIN — GABAPENTIN SCH MG: 300 CAPSULE ORAL at 16:24

## 2024-08-23 NOTE — RAD REPORT
EXAM DESCRIPTION:  Federico Single View8/23/2024 11:39 am

 

CLINICAL HISTORY:  Shortness of breath

 

COMPARISON:  none

 

FINDINGS:  Opacification left lung base

 

The right lung appears clear of acute infiltrate.

 

Heart is mildly enlarged

 

IMPRESSION:  Opacification of the left lung base likely representing a combination of pneumonia and p
leural effusion. This should be followed until it is clear to help exclude a post obstructive process
/underlying mass

## 2024-08-23 NOTE — P.CNS
Date of Consult: 08/23/24


Chief Complaint: NSTEMI


History of Present Illness: 





Patient with PMH of HTN, Heart failure, carotid artery stenting, presented with 

worsening SOB and BLE for the last week, has been taking lasix for the last 

couple days, denies chest pain, no palpitations, no syncope, he usually follow 

ups with [facility] (Dr. Buchanan).


Allergies





Penicillins Allergy (Verified 07/29/23 06:18)


   Hives/Rash





Home medications list reviewed: Yes


Home Medications: 








Amlodipine [Norvasc*] 2 tab PO DAILY 07/29/23 


Atorvastatin Calcium [Lipitor*] 0.5 tab PO DAILY 07/29/23 


Carboxymethylcellulose Sodium [Lubricant Eye Drop] 1 drop OP PRN PRN 07/29/23 


Cyanocobalamin (Vitamin B-12) [Vitamin B-12] 1 tab PO DAILY 07/29/23 


Gabapentin 1 tab PO BEDTIME 07/29/23 


Hydralazine [Apresoline*] 1 tab PO BEDTIME 07/29/23 


Hydralazine [Apresoline*] 2 tab PO DAILY WITH BREAKFAST 07/29/23 


Hydromorphone [Dilaudid*] 1 tab PO DAILY 07/29/23 


Lisinopril [Zestril] 1 tab PO BID 07/29/23 


Metoprolol Succinate [Toprol Xl*] 1 tab PO DAILY 07/29/23 


Omeprazole 1 tab PO BID 07/29/23 


terbinafine HCL [Terbinafine HCl] 1 tab PO DAILY 07/29/23 


Levothyroxine [Synthroid*] 0.05 mg PO DAILYAC #30 tab 07/31/23 








- Past Medical/Surgical History


-: DM


-: CAD


-: Atherosclerosis


-: Back surgery


-: Hip surgery





- Social History


Smoking Status: Former smoker


Alcohol use: No


CD- Drugs: No


Caffeine use: Yes





Review of Systems


10-point ROS is otherwise unremarkable





Physical Examination


General: Alert, In no apparent distress


HEENT: Atraumatic, PERRLA, Mucous membr. moist/pink, EOMI, Sclerae nonicteric


Neck: Supple, 2+ carotid pulse no bruit, No LAD, Without JVD or thyroid 

abnormality


Respiratory: Diminished, Crackles/rales


Cardiovascular: Regular rate/rhythm, Normal S1 S2, Edema (+3 bilateral lower 

extremities)


Gastrointestinal: Normal bowel sounds, No tenderness


Musculoskeletal: No tenderness


Integumentary: No rashes


Neurological: Normal gait, Normal speech, Normal tone, Normal affect


Lymphatics: No axilla or inguinal lymphadenopathy


Laboratory Data (last 24 hrs)











  08/23/24 08/23/24





  11:38 11:38


 


WBC  10.20 


 


Hgb  8.9 L 


 


Hct  28.0 L 


 


Plt Count  247 


 


Sodium   133 L


 


Potassium   5.1


 


BUN   18


 


Creatinine   1.30


 


Glucose   167 H


 


Magnesium   1.7


 


Total Bilirubin   0.3


 


AST   24


 


ALT   21


 


Alkaline Phosphatase   61











- Problems


(1) Acute heart failure


Current Visit: Yes   Status: Acute   


Plan: 


unsure about EF, patient is NYHA 4, with bilateral +3 llower extremities edema,


Lasix 60 mg IV Q8 hours


Monitor input and output


monitor and correct electrolytes


hold lisinopril


get Echo











(2) Elevated troponin


Current Visit: No   Status: Acute   


Plan: 


most likely type 2 MI from acute CHF,


Continue to trend cardiac enzymes


get echo


ASA 81 mg daily


Lipitor 40 mg daily











(3) Hypertension


Current Visit: No   Status: Acute   


Plan: 


D/C metoprolol


Start Coreg 6.25 mg po BID and uptitrate as tolerated.

## 2024-08-23 NOTE — EDPHYS
Physician Documentation                                                                           

 [facility]                                                                 

Name: Canelo Simon                                                                             

Age: 83 yrs                                                                                       

Sex: Male                                                                                         

: 1940                                                                                   

MRN: [id number]                                                                                   

Arrival Date: 2024                                                                          

Time: 10:54                                                                                       

Account#: [id number]                                                                            

Bed 5                                                                                             

Private MD:                                                                                       

ED Physician Ang Suggs                                                                     

HPI:                                                                                              

                                                                                             

11:45 This 83 yrs old Male presents to ER via Wheelchair with complaints of Shortness Of      rt  

      Breath, Weakness.                                                                           

11:45 [facility]. Patient has         rt  

      swelling to the bilateral legs, reports of dyspnea, worse when he lies flat, worse when     

      he walks. States that his diuretics and appoint with me as well. Denies other acute         

      complaints at this time, symptoms are moderate in severity, no other aggravating or         

      alleviating factors..                                                                       

                                                                                                  

Historical:                                                                                       

- Allergies:                                                                                      

13:08 PENICILLINS;                                                                            tm6 

- PMHx:                                                                                           

13:08 coronary atherosclerosis; Diabetes - NIDDM; Hypertension; Congestive heart failure;     tm6 

- PSHx:                                                                                           

13:08 back; hip; Left;                                                                        tm6 

                                                                                                  

- Immunization history:: Adult Immunizations up to date.                                          

- Infectious Disease History:: Denies.                                                            

- Family history:: not pertinent.                                                                 

- Social history:: Smoking status: Patient denies any tobacco usage or history of.                

                                                                                                  

                                                                                                  

ROS:                                                                                              

11:45 Constitutional: Negative for fever, chills, and weight loss, Abdomen/GI: Negative for   rt  

      abdominal pain, nausea, vomiting, diarrhea, and constipation, MS/Extremity: Negative        

      for injury and deformity, Skin: Negative for injury, rash, and discoloration, Neuro:        

      Negative for headache, weakness, numbness, tingling, and seizure,                           

11:45 Cardiovascular: Positive for edema, Negative for chest pain,                                

11:45 Respiratory: Positive for cough, shortness of breath,                                       

                                                                                                  

Exam:                                                                                             

11:45 Constitutional:  This is a well developed, well nourished patient who is awake, alert,  rt  

      and in no acute distress. Head/Face:  Normocephalic, atraumatic. Chest/axilla:  Normal      

      chest wall appearance and motion.  Nontender with no deformity.  No lesions are             

      appreciated. Cardiovascular:  Regular rate and rhythm with a normal S1 and S2.  No          

      gallops, murmurs, or rubs.  Normal PMI, no JVD.  No pulse deficits. Abdomen/GI:  Soft,      

      non-tender, with normal bowel sounds.  No distension or tympany.  No guarding or            

      rebound.  No evidence of tenderness throughout. Skin:  Warm, dry with normal turgor.        

      Normal color with no rashes, no lesions, and no evidence of cellulitis. Neuro:  Awake       

      and alert, GCS 15, oriented to person, place, time, and situation.  Cranial nerves          

      II-XII grossly intact.  Motor strength 5/5 in all extremities.  Sensory grossly intact.     

       Cerebellar exam normal.  Normal gait.                                                      

11:45 ECG was reviewed by the Attending Physician.                                                

11:45 Respiratory: Bibasilar crackles, no respiratory distress,                                   

11:45 Musculoskeletal/extremity: 4+ pitting bilateral extremity edema.                            

                                                                                                  

Vital Signs:                                                                                      

11:18  / 56; Pulse 66; Resp 19; Temp 99.3; Weight 90.72 kg; Height 5 ft. 11 in. ; Pain  ap3 

      8/10;                                                                                       

11:25 Pulse Ox 90% on R/A;                                                                    tm6 

11:30 Pulse Ox 99% on 2 lpm NC;                                                               tm6 

12:35  / 64; Pulse 60; Pulse Ox 98% on 1 lpm NC;                                        tm6 

14:09  / 66; Pulse 59; Pulse Ox 98% on 2 lpm NC;                                        tm6 

11:18 Body Mass Index 27.89 (90.72 kg, 180.34 cm)                                             ap3 

11:18 Pain Scale: Adult                                                                       ap3 

                                                                                                  

MDM:                                                                                              

11:11 Patient medically screened.                                                             rt  

15:09 Differential diagnosis: Pneumonia, CHF. Antibiotic administration: Data reviewed: vital rt  

      signs, nurses notes, lab test result(s), EKG, radiologic studies. Consideration of          

      Admission/Observation Patient was admitted/placed on observation. Management of patient     

      was discussed with the following: Hospitalist: Agrees to admit. Independent                 

      interpretation of the following test(s) in the Emergency Department X-Ray: My               

      interpretation is Left lower lobe consolidation seen on monitor potation of x-ray           

      images, pulmonary edema seen as well.. Test considered but Not performed: CT: Do not        

      suspect pulmonary embolus, CT angiogram not indicated. Care significantly affected by       

      the following chronic conditions: Congestive Heart Failure. Counseling: I had a             

      detailed discussion with the patient and/or guardian regarding the historical points,       

      exam findings, and any diagnostic results supporting the discharge/admit diagnosis, lab     

      results, radiology results, the need for further work-up and treatment in the hospital.     

      Response to treatment: the patient's symptoms have mildly improved after treatment.         

                                                                                                  

                                                                                             

11:20 Order name: Basic Metabolic Panel; Complete Time: 12:15                                 rt  

                                                                                             

11:20 Order name: CBC with Diff; Complete Time: 12:15                                         rt  

                                                                                             

11:20 Order name: LFT's; Complete Time: 12:15                                                 rt  

                                                                                             

11:20 Order name: Magnesium; Complete Time: 12:15                                             rt  

                                                                                             

11:20 Order name: NT PRO-BNP; Complete Time: 12:15                                            rt  

                                                                                             

11:20 Order name: Troponin HS; Complete Time: 12:15                                           rt  

                                                                                             

11:20 Order name: XRAY Chest (1 view); Complete Time: 12:26                                   rt  

                                                                                             

14:12 Order name: Chest Angio; Complete Time: 15:27                                           EDMS

                                                                                             

14:14 Order name: Echo with Doppler                                                           EDMS

                                                                                             

11:20 Order name: Cardiac monitoring; Complete Time: 11:30                                    rt  

                                                                                             

11:20 Order name: EKG - Nurse/Tech; Complete Time: 11:30                                      rt  

                                                                                             

11:20 Order name: IV Saline Lock; Complete Time: 11:40                                        rt  

                                                                                             

11:20 Order name: Labs collected and sent; Complete Time: 11:40                               rt  

                                                                                             

11:20 Order name: O2 Per Protocol; Complete Time: 11:30                                       rt  

                                                                                             

11:20 Order name: O2 Sat Monitoring; Complete Time: 11:30                                     rt  

                                                                                                  

EC:45 Rate is 63 beats/min. Rhythm is regular, Normal Sinus Rhythm with Occasional PVCs,      rt  

      LAFB. Left axis deviation noted. IL interval is normal. QRS interval is normal. QT          

      interval is normal. No Q waves. Interpreted by me.                                          

                                                                                                  

Administered Medications:                                                                         

11:48 Drug: Furosemide IVP 40 mg IVP once; give over 2 minutes Route: IVP; Site: left forearm;tm6 

12:30 Follow up: Response: No adverse reaction                                                tm6 

13:03 Drug: LevaQUIN IVPB 750 mg IVPB once Route: IVPB; Site: left forearm;                   tm6 

15:17 Follow up: Response: No adverse reaction; IV Status: Completed infusion; IV Intake:     tm6 

      250ml                                                                                       

                                                                                                  

                                                                                                  

Disposition Summary:                                                                              

24 13:44                                                                                    

Hospitalization Ordered                                                                           

 Notes:       Hospitalization Status: Inpatient Admission                                           
  rt

      Provider: Chance Prado                                                                rt  

      Location: Telemetry/MedSurg (Inpatient)                                                 rt  

      Condition: Stable                                                                       rt  

      Problem: new                                                                            rt  

      Symptoms: are unchanged                                                                 rt  

      Bed/Room Type: Standard                                                                 rt  

      Room Assignment: 232(24 14:28)                                                    eb  

      Diagnosis                                                                                   

        - Systolic (congestive) heart failure                                                 rt  

        - Unspecified bacterial pneumonia                                                     rt  

      Forms:                                                                                      

        - Medication Reconciliation Form                                                      rt  

        - SBAR form                                                                           rt  

        - Leadership Thank You Letter                                                         rt  

Signatures:                                                                                       

Dispatcher [employer]                           EDMS                                                 

Dilia Mercado                           eb                                                   

Ang Suggs MD MD   rt                                                   

Lupe Villanueva RN                   RN   tm6                                                  

                                                                                                  

Corrections: (The following items were deleted from the chart)                                    

11:20 11:20 BASIC METABOLIC PANEL+C.LAB.BRZ ordered. EDMS                                     EDMS

11:20 11:20 CBC+H.LAB.BRZ ordered. EDMS                                                       EDMS

11:20 11:20 HEPATIC FUNCTION+C.LAB.BRZ ordered. EDMS                                          EDMS

11:20 11:20 MAGNESIUM+C.LAB.BRZ ordered. EDMS                                                 EDMS

11:20 11:20 PROBNP+C.LAB.BRZ ordered. EDMS                                                    EDMS

11:20 11:20 Troponin High Sensitivity+C.LAB.BRZ ordered. EDMS                                 EDMS

11:20 11:20 Chest Single View+RAD.RAD.BRZ ordered. EDMS                                       EDMS

14:28 13:44 rt                                                                                eb  

                                                                                                  

**************************************************************************************************

## 2024-08-23 NOTE — P.HP
Certification for Inpatient


Patient admitted to: Inpatient


With expected LOS: <2 Midnights





<Shi Long - Last Filed: 08/23/24 15:33>





Patient History


Date of Service: 08/23/24


Reason for admission: NSTEMI


History of Present Illness: 





83-year-old male with a past medical history of COPD, hypertension, diabetes, 

congestive heart failure, non-insulin-dependent diabetes, presents to the 

emergency room with shortness of breath.  He reports associated bilateral leg 

swelling.  He reports shortness of breath worse with exertion, worse while 

laying flat, he denies chest pain, abdominal pain, nausea vomiting diarrhea, 

fever.  Plan to admit for NSTEMI, acute on chronic heart failure, acute hypoxic 

respiratory failure from diastolic heart failure, bacterial pneumonia, on 

telemetry, IV antibiotics, diuretics, with cardiology to consult





ER evaluation 90% on room air, blood pressure 150/56, EKG s 63 beats/min. Rhythm

is regular, Normal Sinus Rhythm with Occasional PVCs, rt  LAFB. Left axis 

deviation noted. AL interval is normal. QRS interval is normal. QT  interval is 

normal, was treated with 40 mg of Lasix, Levaquin 750,





- Past Medical/Surgical History


-: DM


-: CAD


-: Atherosclerosis


-: Back surgery


-: Hip surgery





- Social History


Alcohol use: No


CD- Drugs: No


Caffeine use: Yes





<Shi Long - Last Filed: 08/23/24 15:33>


Date of Service: 08/23/24





<Thania Bronson - Last Filed: 08/26/24 03:25>


Allergies





Penicillins Allergy (Verified 07/29/23 06:18)


   Hives/Rash





Home Medications: 








Atorvastatin Calcium [Lipitor*] 0.5 tab PO DAILY 07/29/23 


Carboxymethylcellulose Sodium [Lubricant Eye Drop] 1 drop OP PRN PRN 07/29/23 


Cyanocobalamin (Vitamin B-12) [Vitamin B-12] 1 tab PO DAILY 07/29/23 


Gabapentin 2 tab PO BEDTIME 07/29/23 


Hydralazine [Apresoline*] 1 tab PO BEDTIME 07/29/23 


Hydralazine [Apresoline*] 2 tab PO DAILY WITH BREAKFAST 07/29/23 


Lisinopril [Zestril] 1 tab PO BID 07/29/23 


Metoprolol Succinate [Toprol Xl*] 2 tab PO DAILY 07/29/23 


Omeprazole 1 cap PO BID 07/29/23 


Albuterol Inhaler [Ventolin Inhaler*] 1 puff PO QID PRN 08/23/24 


Aspirin Chewable [Aspirin Chewable*] 1 tab PO DAILY 08/23/24 


Levothyroxine [Synthroid*] 0.05 mg PO DAILY 08/23/24 


Metformin HCl 1,000 mg PO BID 08/23/24 


Morphine *Extended Release* [MS Contin*] 1 tab PO BEDTIME 08/23/24 


Multivitamin 1 each PO DAILY 08/23/24 








Review of Systems


per HPI





<Shi Long - Last Filed: 08/23/24 15:33>





Physical Examination





- Physical Exam


General: Alert, Oriented x3, Mild distress


HEENT: Normocephalic, PERRLA


Neck: 2+ carotid pulse no bruit, JVD not distended


Respiratory: Diminished, Crackles/rales


Cardiovascular: Normal pulses, Regular rate/rhythm, Edema (lower extremity)


Capillary refill: <2 Seconds


Gastrointestinal: Normal bowel sounds, Soft and benign


Musculoskeletal: No swelling, No contractures


Integumentary: No breakdown, No significant lesion


Neurological: Normal speech, Normal strength at 5/5 x4 extr, Sensation intact





- Studies


Laboratory Data (last 24 hrs)











  08/23/24 08/23/24





  11:38 11:38


 


WBC  10.20 


 


Hgb  8.9 L 


 


Hct  28.0 L 


 


Plt Count  247 


 


Sodium   133 L


 


Potassium   5.1


 


BUN   18


 


Creatinine   1.30


 


Glucose   167 H


 


Magnesium   1.7


 


Total Bilirubin   0.3


 


AST   24


 


ALT   21


 


Alkaline Phosphatase   61











<Shi Long - Last Filed: 08/23/24 15:33>





Assessment and Plan





- Plan


Assessment plan 


Acute on chronic heart failure unknown baseline 


Elevated BNP 


Extremity edema


Hypertension


Hyperlipidemia


Cardiology consult, telemetry 


Echo ordered, diuretics, 


Resume home meds 


R evaluation 90% on room air, blood pressure 150/56, EKG s 63 beats/min. Rhythm 

is regular, Normal Sinus Rhythm with Occasional PVCs, rt  LAFB. Left axis 

deviation noted. AL interval is normal. QRS interval is normal. QT  interval is 

normal, was treated with IV Lasix, IV Levaquin 





Acute hypoxic respiratory failure secondary to decompensated heart failure 

versus pneumonia


Left lung base bacterial pneumonia


COPD


CT significant area of solid lung consolidation left lung base measuring -

approximately 7 x 7 cm-


CT of the chest no evidence of PE


pulmonary consulted to eval


O2 2 L keep sats greater than 92% 


IV antibiotics, nebs, 





History of falls


PT eval





Chronic pain


As needed analgesia





Chronic c


onstipation


PRNs stool softener





Diabetes non-insulin-dependent


Accu-Cheks, sliding scale insulin





Full code


DVT SCDs


Diet cardiac





Disposition pending hospital course











E


Discharge Plan: Home





- Advance Directives


Does patient have a Living Will: No


Does patient have a Durable POA for Healthcare: No





- Code Status/Comfort Care


Code Status: Full Code


Critical Care: No


Time Spent Managing Pts Care (In Minutes): 55





<Shi Long - Last Filed: 08/23/24 15:33>


Date of Service: 08/23/24





Patient was seen and examined.  Events of the last 24 hours have been noted.  

Spoke with with JASON regarding patient's clinical picture after evaluating and 

examining the patient independently. I performed a substantial part of the MDM 

during this patient's care today. I personally made or approved the documented 

management plan and acknowledge its risk of complications. I agree with the 

findings and documentation provided in the JASON's notes. 








Patient with fluid overload.  Patient diuresed and patient will be admitted for 

acute CHF exacerbation.  Continue monitoring volume status and repeat chest x-

ray.





<Thania Bronson - Last Filed: 08/26/24 03:25>

## 2024-08-23 NOTE — ER
Nurse's Notes                                                                                     

 [facility]                                                                 

Name: Canelo Simon                                                                             

Age: 83 yrs                                                                                       

Sex: Male                                                                                         

: 1940                                                                                   

MRN: [id number]                                                                                   

Arrival Date: 2024                                                                          

Time: 10:54                                                                                       

Account#: [id number]                                                                            

Bed 5                                                                                             

Private MD:                                                                                       

Diagnosis: Systolic (congestive) heart failure;Unspecified bacterial pneumonia                    

                                                                                                  

Presentation:                                                                                     

                                                                                             

11:18 Chief complaint: Patient states: [facility]       ap3 

      lungs". patient reports increased leg swelling and shortness of breath. Coronavirus         

      screen: At this time, the client does not indicate any symptoms associated with             

      coronavirus-19. Ebola Screen: No symptoms or risks identified at this time. Initial         

      Sepsis Screen: Does the patient meet any 2 criteria? No. Patient's initial sepsis           

      screen is negative. Does the patient have a suspected source of infection? No.              

      Patient's initial sepsis screen is negative. Initial Sepsis Screen: Does the patient        

      meet any 2 criteria?. Risk Assessment: Do you want to hurt yourself or someone else?        

      Patient reports no desire to harm self or others. Onset of symptoms is unknown.             

11:18 Method Of Arrival: Wheelchair                                                           ap3 

11:18 Acuity: HANANE 3                                                                           ap3 

                                                                                                  

Triage Assessment:                                                                                

11:21 General: Appears distressed, Behavior is calm, cooperative, appropriate for age. Pain:  ap3 

      Complains of pain in back Pain began years ago. Is chronic. Neuro: Level of                 

      Consciousness is awake, alert, obeys commands, Oriented to person, place, time,             

      situation. Cardiovascular: Patient's skin is warm and dry. Respiratory: Reports             

      shortness of breath Airway is patent.                                                       

11:30 Respiratory: the patient has mild shortness of breath.                                  tm6 

                                                                                                  

Historical:                                                                                       

- Allergies:                                                                                      

13:08 PENICILLINS;                                                                            tm6 

- PMHx:                                                                                           

13:08 coronary atherosclerosis; Diabetes - NIDDM; Hypertension; Congestive heart failure;     tm6 

- PSHx:                                                                                           

13:08 back; hip; Left;                                                                        tm6 

                                                                                                  

- Immunization history:: Adult Immunizations up to date.                                          

- Infectious Disease History:: Denies.                                                            

- Family history:: not pertinent.                                                                 

- Social history:: Smoking status: Patient denies any tobacco usage or history of.                

                                                                                                  

                                                                                                  

Screenin:32 [facility] ED Fall Risk Assessment (Adult) History of falling in the last 3 months,       tm6 

      including since admission No falls in past 3 months (0 pts) Confusion or Disorientation     

      No (0 pts) Intoxicated or Sedated No (0 pts) Impaired Gait Yes (1 pt) Mobility Assist       

      Device Used No (0 pt) Altered Elimination No (0 pt) Score/Fall Risk Level 0 - 2 = Low       

      Risk Oriented to surroundings, Maintained a safe environment, Educated pt \T\ family on     

      fall prevention, incl call for assistance when getting out of bed. Abuse screen: Denies     

      threats or abuse. Denies injuries from another. Nutritional screening: No deficits          

      noted. Tuberculosis screening: No symptoms or risk factors identified.                      

                                                                                                  

Assessment:                                                                                       

11:31 General: Appears uncomfortable, Behavior is cooperative. Neuro: Level of Consciousness  tm6 

      is awake, alert, obeys commands, Oriented to person, place, time, situation.                

      Cardiovascular: Patient's skin is warm and dry. Rhythm is regular. Respiratory: Airway      

      is patent Respiratory effort is labored, Respiratory pattern is symmetrical, Breath         

      sounds are coarse. GI: No signs and/or symptoms were reported involving the                 

      gastrointestinal system. Abdomen is flat, non-distended. : No signs and/or symptoms       

      were reported regarding the genitourinary system. EENT: No signs and/or symptoms were       

      reported regarding the EENT system. Derm: No signs and/or symptoms reported regarding       

      the dermatologic system.                                                                    

11:31 Pain: Complains of pain in back and chest Pain does not radiate. Pain currently is 8    tm6 

      out of 10 on a pain scale. Derm:. Musculoskeletal: Swelling present in right leg and        

      left leg Reports edema in legs bilaterally.                                                 

12:36 Reassessment: Patient appears in no apparent distress at this time. Patient and/or      tm6 

      family updated on plan of care and expected duration. Pain level reassessed. Patient is     

      alert, oriented x 3, equal unlabored respirations, skin warm/dry/pink.                      

12:36 Reassessment: patient has chronic back pain, has pain pump.                             tm6 

14:10 Reassessment: No changes from previously documented assessment.                         tm6 

14:45 Reassessment: Reassessment: report faxed to [facility], confirmed by Nakia.                  tm6 

                                                                                                  

Vital Signs:                                                                                      

11:18  / 56; Pulse 66; Resp 19; Temp 99.3; Weight 90.72 kg; Height 5 ft. 11 in. ; Pain  ap3 

      8/10;                                                                                       

11:25 Pulse Ox 90% on R/A;                                                                    tm6 

11:30 Pulse Ox 99% on 2 lpm NC;                                                               tm6 

12:35  / 64; Pulse 60; Pulse Ox 98% on 1 lpm NC;                                        tm6 

14:09  / 66; Pulse 59; Pulse Ox 98% on 2 lpm NC;                                        tm6 

11:18 Body Mass Index 27.89 (90.72 kg, 180.34 cm)                                             ap3 

11:18 Pain Scale: Adult                                                                       ap3 

                                                                                                  

ED Course:                                                                                        

10:57 Patient arrived in ED.                                                                  mr  

11:02 Ang Suggs MD is Attending Physician.                                            rt  

11:20 Triage completed.                                                                       ap3 

11:25 Lupe Villanueva, RN is Primary Nurse.                                                 tm6 

11:25 EKG done, by ED staff, reviewed by Ang Suggs MD.                                  tm6 

11:30 Arm band placed on right wrist.                                                         tm6 

11:32 Patient has correct armband on for positive identification. Bed in low position. Call   tm6 

      light in reach. Side rails up X2. Provided Education on: use of call bell. Client           

      placed on continuous cardiac and pulse oximetry monitoring. NIBP monitoring applied.        

      Cardiac monitor on. Pulse ox on. NIBP on. Door closed. Noise minimized. Warm blanket        

      given. Pillow given.                                                                        

11:40 Inserted saline lock: 20 gauge in left forearm, using aseptic technique. Blood          tm6 

      collected. Flushed with 10 mL NS.                                                           

11:40 Basic Metabolic Panel Sent.                                                             tm6 

11:40 CBC with Diff Sent.                                                                     tm6 

11:40 LFT's Sent.                                                                             tm6 

11:40 Magnesium Sent.                                                                         tm6 

11:40 NT PRO-BNP Sent.                                                                        tm6 

11:40 Troponin HS Sent.                                                                       tm6 

11:41 XRAY Chest (1 view) In Process Unspecified.                                             EDMS

13:04 Assisted with urinal.                                                                   tm6 

13:42 Chance Prado is Hospitalizing Provider.                                               rt  

14:04 1404 CM met with  at the bedside in the ED exam room. Patient identified by   ane 

      name and . Demographic sheet confirmed. Patient reports he lives with his daughter       

      Jackie in a 2 story home though he reports living on the first floor only. DME in the       

      home includes a couple canes, a wheelchair, shower chair, crutches and a rollator.          

      Patient reports using the rollator most of the time. No MPOA in place at this time. No      

      HH, home oxygen, or medical services at this time.  prefers to discharge          

      home. He goes on to explain that his daughter drives him to doctor appointments,            

      grocery store trips and will be able to transport him home when he is discharged. CM        

      team will continue to follow and coordinate care.                                           

14:24 Chest Angio In Process Unspecified.                                                     EDMS

15:20 No provider procedures requiring assistance completed. Patient admitted, IV remains in  tm6 

      place.                                                                                      

                                                                                                  

Administered Medications:                                                                         

11:48 Drug: Furosemide IVP 40 mg IVP once; give over 2 minutes Route: IVP; Site: left forearm;tm6 

12:30 Follow up: Response: No adverse reaction                                                tm6 

13:03 Drug: LevaQUIN IVPB 750 mg IVPB once Route: IVPB; Site: left forearm;                   tm6 

15:17 Follow up: Response: No adverse reaction; IV Status: Completed infusion; IV Intake:     tm6 

      250ml                                                                                       

                                                                                                  

                                                                                                  

Medication:                                                                                       

11:32 VIS not applicable for this client.                                                     tm6 

                                                                                                  

Intake:                                                                                           

15:17 IV: 250ml; Total: 250ml.                                                                tm6 

                                                                                                  

Output:                                                                                           

15:17 Urine: 1100ml (Voided); Total: 1100ml.                                                  tm6 

                                                                                                  

Outcome:                                                                                          

13:44 Decision to Hospitalize by Provider.                                                    rt  

15:20 Admitted to Med/surg accompanied by tech, via wheelchair, with oxygen, with chart,      tm6 

15:20 Condition: stable                                                                           

15:20 Instructed on the need for admit,                                                           

15:29 Patient left the ED.                                                                    eb  

                                                                                                  

Signatures:                                                                                       

Dispatcher [employer]                           EDMS                                                 

AyersRima, Reg                       Reg  mr                                                   

DharmeshjesseeKim RN                    RN   [email or web address]                                                  

Dilia Mercado                           eb                                                   

Ang Suggs MD MD   rt                                                   

Lupe Villanueva RN RN   tm6                                                  

Lissette Abdul RN RN ane                                                  

                                                                                                  

**************************************************************************************************

## 2024-08-23 NOTE — XMS REPORT
Continuity of Care Document



                           Created on: 2024





JJ SIMON

External Reference #: [id number]

: 1940

Sex: Male



Demographics





                                        Address             [address]

[place]  [address]

 

                                        Home Phone          [phone]

 

                                        Mobile Phone        [phone]

 

                                        Email Address       [email or web address]

 

                                        Preferred Language  English

 

                                        Marital Status      Unknown

 

                                        Latter-day Affiliation Unknown

 

                                        Race                Unknown

 

                                        Additional Race(s)  Unavailable

White

 

                                        Ethnic Group        Unknown





Author





                                        Name                Unknown

 

                                        Address             [address]

[place]  [address]

 

                                        [facility]

[employer]

 

                                        Address             [address]

[address]

[place]  [address]

 

                                        Phone               [phone]





Support





                          Name         Relationship Address      Phone

 

                                Jackie Cosme   Child           [address]

[place]  [address]                 [phone]





Care Team Providers





                                Care Team Member Name Role            Phone

 

                                [place], [facility]3] Primary Car

e Physician [phone]

 

                                RAFAEL MEJIAS   Attending Clinician Unavailable

 

                                Doctor Unassigned, No Name Attending Clinician U

carol Grijalva MD, Mina Attending Clinician [phone]

 

                                Rafael Mejias MD Attending Clinician [phone]

 

                                SUMEET MCKEON Attending Clinician UnavailSumeet Pablo MD Attending Clinician [phone]

 

                                [facility] Attending Clinician UnavailMIKEY Quintanilla   Attending Clinician Unavailable

 

                                Warren Poon MD Attending Clinician [phone]

[phone]

 

                                WARREN POON Attending Clinician Unavail

able

 

                                WARREN POON Attending Clinician Unavail

able

 

                                SUMEET MCKEON Admitting Clinician Sumeet Rodríguez MD Admitting Clinician [phone]







Payers





                    Payer Name Policy Type Policy Number Effective Date Expirati

on Date Source







Problems





                                                    Condition 

Name                                    Condition 

Details                                 Condition 

Category                  Status                    Onset 

Date                                    Resolution 

Date                                    Last 

Treatment 

Date                                    Treating 

Clinician                 Comments                  Source

 

                                                    Coronary 

artery 

disease 

involving 

native 

coronary 

artery of 

native 

heart 

without 

angina 

pectoris                                Coronary 

artery 

disease 

involving 

native 

coronary 

artery of 

native 

heart 

without 

angina 

pectoris            Disease             Active               

00:00:

00                                                               [facility]

 

                                                    Ascending 

aorta 

dilatation                              Ascending 

aorta 

dilatation          Disease             Active               

00:00:

00                                                               [facility]

 

                                                    Stenosis 

of left 

carotid 

artery                                  Stenosis 

of left 

carotid 

artery              Disease             Active               

00:00:

00                                                               [facility]

 

                                                    Preop 

cardiovasc

ular exam                               Preop 

cardiovasc

ular exam           Disease             Active               

00:00:

00                                                               [facility]

 

                                                    Mixed 

hyperlipid

emia                                    Mixed 

hyperlipid

emia                Disease             Active              2023-0

1-10 

00:00:

00                                                               [facility]

 

                                                    Primary 

hypertensi

on                                      Primary 

hypertensi

on                  Disease             Active              2023-0

1-10 

00:00:

00                                                               [facility]

 

                                                    No known 

active 

problems                                No known 

active 

problems Disease                                                 [facility]







Allergies, Adverse Reactions, Alerts





                                                    Allergy 

Name                                    Allergy 

Type            Status          Severity        Reaction(s)     Onset 

Date                                    Inactive 

Date                                    Treating 

Clinician                 Comments                  Source

 

                                                    PENICILL

INS                                     Drug 

Class           Active                          Rash             

00:00:

00                                                              [facility]

 

                                                    Penicill

ins                                     Propensi

ty to 

adverse 

reaction

s               Active                          Rash             

00:00:

00                                                              [facility]







Social History





                    Social Habit Start Date Stop Date Quantity  Comments  Source

 

                    [facility]

 

                                                    Alcoholic beverage 

intake                                  2024 

00:00:00                                2024 

00:00:00                                Current drinker 

of alcohol 

(finding)                                           [facility]

 

                                                    History of Social 

function                                2024 

00:00:00                                2024 

00:00:00                                                    [facility]

 

                                        Alcohol intake      2024 

00:00:00                                2024 

00:00:00                                Current drinker 

of alcohol 

(finding)                                           [facility]

 

                                                    Exposure to 

SARS-CoV-2 (event)                      2023 

00:00:00                                2023 

13:41:00            Not sure                                [facility]

 

                                        Alcohol Comment     2017 

00:00:00                                2017 

00:00:00            occasional                              [facility]

 

                                                    History of tobacco 

use                                     1988 

00:00:00                                2016 

00:00:00            Cigarette Smoker                        [facility]

 

                                                    Sex assigned at 

birth                                   1940 

00:00:00                                1940 

00:00:00                                                    [facility]







                          Smoking Status Start Date   Stop Date    Source

 

                          Ex-smoker    2023-01-10 00:00:00 2023-01-10 00:00:00 U

[facility]







Medications





                                                    Ordered 

Medication 

Name                                    Filled 

Medication 

Name                                    Start 

Date                                    Stop 

Date                                    Current 

Medication?                             Ordering 

Clinician       Indication      Dosage          Frequency       Signature 

(SIG)               Comments            Components          Source

 

                                                    HYDROcodone

-acetaminop

hen (NORCO 

5) 5-325 mg 

tablet 1 

tablet                                               

13:45:

00                                       

13:40

:00        No                               1{tbl}                1 tablet, 

Oral, 

ONCE, 1 

dose, On 

24 at 

0845, 

Routine, 

PACU                                                        [facility]

 

                                                    lactated 

ringers IV 

infusion 

1,000 mL                                             

13:45:

00                                       

17:29

:23        No                               1000mL                at 100 

mL/hr, 

1,000 mL, 

IV 

Infusion, 

CONTINUOUS

, Starting 

on 24 at 

0845, 

Until 24 at 

1229, 

Routine, 

PACU                                                        [facility]

 

                                                    HYDROmorphO

ne 

(DILAUDID) 

injection 

0.2 mg                                               

13:35:

17                                       

17:29

:23        No                               .2mg                  0.2 mg, 

Slow IV 

Push, 

Q5MIN PRN, 

10 doses, 

Starting 

on 24 at 

0835, 

Until 24 at 

1229, 

Routine, 

Pain 

(scale 

7-10), 

PACU<br>Us

e approved 

by 

(Faculty): 

PACU USE 

-ANESTHESI

A 

SERVICE-HY

DROMORPHON

E 

INJECTIONS                                                  [facility]

 

                                                    FENTanyl PF 

(SUBLIMAZE 

(PF)) 

injection 

25 mcg                                               

13:35:

17                                       

17:29

:23        No                               25ug                  25 mcg, 

Slow IV 

Push, 

Q5MIN PRN, 

4 doses, 

Starting 

on 24 at 

0835, 

Until 24 at 

1229, 

Routine, 

Pain 

(scale 

4-6), PACU                                                  [facility]

 

                                                    ondansetron 

(ZOFRAN 

(PF)) 

injection 4 

mg                                                   

13:35:

17                                       

17:29

:23        No                               4mg                   4 mg, Slow 

IV Push, 

PRN, 1 

dose, 

Starting 

on 24 at 

0835, 

Until 24 at 

1229, 

Routine, 

Nausea and 

Vomiting 

(N/V), 

PACU                                                        [facility]

 

                                                    bupivacaine 

(preserv 

free) 

(SENSORCAIN

E MPF) 0.25 

% (2.5 

mg/mL) 

injection                                            

12:39:

00                                       

13:18

:07        No                                                     PRN, 

Starting 

on 24 at 

0739, 

Until 24 at 

0818, 

Routine, 

Intra-op                                                    [facility]

 

                                                    sodium 

chloride 

0.9 % 

irrigation 

solution                                             

12:30:

00                                       

13:18

:07        No                                                     PRN, 

Starting 

on 24 at 

0730, 

Until 24 at 

0818, 

Intra-op                                                    [facility]

 

                                                    lactated 

ringers IV 

infusion 

1,000 mL                                             

11:45:

00                                       

12:05

:00        No                               1000mL                at 42 

mL/hr, 

1,000 mL, 

IV 

Infusion, 

ONCE, 1 

dose, On 

24 at 

0645, 

Routine, 

DSU Pre-op                                                  [facility]

 

                                                    aspirin 81 

mg chewable 

tablet                                               

10:29:

18                  Yes                           81mg                Take 1 

tablet by 

mouth in 

the 

morning.                                                    [facility]

 

                                                    Bisacodyl 5 

mg Tab                                               

10:29:

18                  Yes                           1{tbl}              Take 1 

tablet by 

mouth 2 

(two) 

times 

daily.                                                      [facility]

 

                                                    docusate 

100 mg 

capsule                                              

10:29:

18                  Yes                           100mg               Take 1 

capsule by 

mouth in 

the 

morning 

and 1 

capsule in 

the 

evening.                                                    [facility]

 

                                                    MULTIVITAMI

N ORAL                                               

10:29:

18                  Yes                           1{tbl}              Take 1 

tablet by 

mouth 

daily.                                                      [facility]

 

                                                    Cholecalcif

venkatesh, 

Vitamin D3, 

10 mcg (400 

unit) 

capsule                                              

10:29:

18                  Yes                           400U                Take 1 

capsule by 

mouth in 

the 

morning.                                                    [facility]

 

                                                    vitamin 

B-12 

(VITAMIN 

B-12) 100 

mcg tablet                                           

10:29:

18                  Yes                           100ug               Take 1 

tablet by 

mouth in 

the 

morning.                                                    [facility]

 

                                                    hydralAZINE 

10 mg 

tablet                                               

10:29:

18                  Yes                           10mg                Take 1 

tablet by 

mouth in 

the 

morning 

and 1 

tablet in 

the 

evening. 

Indication

s: 2 tabs 

in AM and 

1 tab in 

PM                                                          [facility]

 

                                                    lisinopril 

20 mg 

tablet                                               

10:29:

18                  Yes                           20mg                Take 1 

tablet by 

mouth in 

the 

morning 

and 1 

tablet in 

the 

evening.                                                    [facility]

 

                                                    omeprazole 

20 mg 

capsule                                              

10:29:

18                  Yes                           20mg                Take 1 

capsule by 

mouth in 

the 

morning.                                                    [facility]

 

                                                    Carboxymeth

ylcellulose

-Glycern 

0.5-0.9 % 

Drop                                                 

10:29:

18                  Yes                                               Place in 

each eye.                                                   [facility]

 

                                                    metoprolol 

tartrate 25 

mg tablet                                            

10:29:

18                                       

00:00

:00        No                               25mg                  Take 1 

tablet by 

mouth in 

the 

morning.                                                    [facility]

 

                                                    Cholecalcif

venkatesh, 

Vitamin D3, 

10 mcg (400 

unit) 

capsule                                              

10:57:

31                  Yes                           400U                Take 1 

capsule by 

mouth in 

the 

morning.                                                    [facility]

 

                                                    amLODIPine 

5 mg tablet                                          

00:00:

00                  Yes                 [id number]  5mg                 Take 1 

tablet by 

mouth in 

the 

morning.                                                    [facility]

 

                                                    ticagrelor 

90 mg 

tablet                                              2023-0

1-10 

14:21:

22                                      2023-

01-10 

00:00

:00        No                               90mg                  Take 90 mg 

by mouth 2 

(two) 

times 

daily.                                                      [facility]

 

                                                    nitroglycer

in 0.4 mg 

sublingual 

tablet                                              2023-0

1-10 

00:00:

00                  Yes                 [id number]  .4mg                Place 1 

tablet 

under the 

tongue 

every 5 

(five) 

minutes as 

needed for 

Chest 

pain.                                                       [facility]

 

                                                    amLODIPine 

5 mg tablet                                         2023-0

1-10 

00:00:

00                                       

00:00

:00        No                               10mg                  Take 2 

tablets by 

mouth in 

the 

morning.                                                    [facility]

 

                                                    insulin NPH 

100 unit/mL 

injection                                            

10:52:

44                                       

00:00

:00        No                               25U                   inject 25 

Units 

under the 

skin every 

morning 

and 

evening.                                                    [facility]

 

                                                    MULTIVITAMI

N ORAL                                               

09:19:

47                  Yes                           1{tbl}              Take 1 

tablet by 

mouth 

daily.                                                      [facility]

 

                                                    Bisacodyl 5 

mg Tab                                               

09:19:

08                  Yes                           1{tbl}              Take 1 

tablet by 

mouth 2 

(two) 

times 

daily.                                                      [facility]

 

                                                    docusate 

100 mg 

capsule                                              

09:19:

08                  Yes                           100mg               Take 1 

capsule by 

mouth in 

the 

morning 

and 1 

capsule in 

the 

evening.                                                    [facility]

 

                                                    Cholecalcif

venkatesh, 

Vitamin D3, 

10 mcg (400 

unit) 

capsule                                              

09:19:

08                        Yes                                    1{capsu

le}                                                 Take 1 

capsule by 

mouth 

daily.                                                      [facility]

 

                                                    hydralAZINE 

10 mg 

tablet                                               

09:19:

08                  Yes                           10mg                Take 1 

tablet by 

mouth in 

the 

morning 

and 1 

tablet in 

the 

evening. 

Indication

s: 2 tabs 

in AM and 

1 tab in 

PM                                                          [facility]

 

                                                    lisinopril 

20 mg 

tablet                                               

09:19:

08                  Yes                           20mg                Take 1 

tablet by 

mouth in 

the 

morning 

and 1 

tablet in 

the 

evening.                                                    [facility]

 

                                                    omeprazole 

20 mg 

capsule                                              

09:19:

08                  Yes                           20mg                Take 1 

capsule by 

mouth in 

the 

morning.                                                    [facility]

 

                                                    Carboxymeth

ylcellulose

-Glycern 

0.5-0.9 % 

Drop                                                 

09:19:

08                  Yes                                               Place in 

each eye.                                                   [facility]

 

                                                    gabapentin 

300 mg 

capsule                                              

00:00:

00                              Yes                             [id number]

[id number]              600mg                                   Take 2 

capsules 

by mouth 

at 

bedtime.                                                    [facility]

 

                                                    amLODIPine 

10 mg 

tablet                                              2022 

00:00:

00                                      2023-

01-10 

00:00

:00     No                      10mg            10 mg.                  [facility]

 

                                                    HYDROmorpho

ne 4 mg 

tablet                                              2022 

00:00:

00                  Yes                                               TAKE 1 

TABLET BY 

MOUTH ONCE 

DAILY FOR 

28 DAYS                                                     [facility]

 

                                                    metFORMIN 

1,000 mg 

tablet                                              2022 

00:00:

00                  Yes                           1000mg              Take 1 

tablet by 

mouth in 

the 

morning 

and 1 

tablet in 

the 

evening. 

Take with 

meals.                                                      [facility]

 

                                                    terbinafine 

 mg 

tablet                                              2022 

00:00:

00            Yes                  250mg         1 tablet.               [facility]

 

                                                    atorvastati

n 20 mg 

tablet                                              2022 

00:00:

00                  Yes                           10mg                0.5 

tablets.                                                    [facility]

 

                                                    metoprolol 

succinate 

XL 25 mg 24 

hr tablet                                           2022 

00:00:

00            Yes                  25mg          1 tablet.               [facility]

 

                                                    gabapentin 

300 mg 

capsule                                             2022 

00:00:

00                                       

00:00

:00     No                      300mg           300 mg.                 [facility]

 

                                                    albuterol-i

pratropium 

 

mcg/actuati

on inhaler                                           

00:00:

00                  Yes                                               INHALE 1 

PUFF MOUTH 

FOUR TIMES 

A DAY AS 

NEEDED 

**REPLACES 

COMBIVENT 

INHALER. 

USE ONLY 

ONE 

INHALATION 

PER DOSE**                                                  [facility]

 

                                                    aspirin 81 

mg chewable 

tablet                                               

12:36:

02                  Yes                           81mg                Take 1 

tablet by 

mouth in 

the 

morning.                                                    [facility]

 

                                                    vitamin 

B-12 

(VITAMIN 

B-12) 100 

mcg tablet                                           

12:36:

02                  Yes                           100ug               Take 1 

tablet by 

mouth in 

the 

morning.                                                    [facility]

 

                                                    metoprolol 

tartrate 25 

mg tablet                                            

12:36:

02                  Yes                           25mg                Take 1 

tablet by 

mouth in 

the 

morning.                                                    [facility]

 

                                                    Bisacodyl 5 

mg Tab                                               

12:36:

02                  Yes                           1{tbl}              Take 1 

tablet by 

mouth 2 

(two) 

times 

daily.                                                      [facility]

 

                                                    docusate 

100 mg 

capsule                                              

12:36:

02                  Yes                           100mg               Take 100 

mg by 

mouth 2 

(two) 

times 

daily.                                                      [facility]

 

                                                    MULTIVITAMI

N ORAL                                               

12:36:

02                  Yes                           1{tbl}              Take 1 

tablet by 

mouth 

daily.                                                      [facility]

 

                                                    Cholecalcif

venkatesh, 

Vitamin D3, 

(VITAMIN 

D3) 400 

unit 

capsule                                              

12:36:

02                        Yes                                    1{capsu

le}                                                 Take 1 

capsule by 

mouth 

daily.                                                      [facility]

 

                                                    ticagrelor 

90 mg 

tablet                                               

12:36:

02                  Yes                           90mg                Take 90 mg

 

by mouth 2 

(two) 

times 

daily.                                                      [facility]

 

                                                    hydralAZINE 

10 mg 

tablet                                               

12:36:

02                  Yes                           10mg                Take 10 mg

 

by mouth 2 

(two) 

times 

daily. 

Indication

s: 2 tabs 

in AM and 

1 tab in 

PM                                                          [facility]

 

                                                    lisinopril 

20 mg 

tablet                                               

12:36:

02                  Yes                           20mg                Take 20 mg

 

by mouth 2 

(two) 

times 

daily.                                                      [facility]

 

                                                    omeprazole 

20 mg 

capsule                                              

12:36:

02                  Yes                           20mg                Take 20 mg

 

by mouth 

daily.                                                      [facility]

 

                                                    insulin NPH 

100 unit/mL 

injection                                            

12:36:

02                  Yes                           25U                 inject 25 

Units 

under the 

skin every 

morning 

and 

evening.                                                    [facility]







Immunizations





                                                    Ordered 

Immunization Name                       Filled 

Immunization Name Date            Status          Comments        Source

 

                                Influenza High Dose                 2022 

00:00:00            Completed                               [facility]

 

                                Influenza High Dose                 2022 

00:00:00            Completed                               [facility]

 

                                Influenza High Dose                 2022 

00:00:00            Completed                               [facility]

 

                                Influenza High Dose                 2022 

00:00:00            Completed                               [facility]

 

                                Influenza High Dose                 2022 

00:00:00            Completed                               [facility]

 

                                Influenza High Dose                 2022 

00:00:00            Completed                               [facility]

 

                    Influenza High Dose           Unknown   Completed           

[facility]

 

                    Influenza High Dose           Unknown   Completed           

[facility]

 

                    Influenza High Dose           Unknown   Completed           

[facility]

 

                    Influenza High Dose           Unknown   Completed           

[facility]

 

                    Influenza High Dose           Unknown   Completed           

[facility]

 

                    Influenza High Dose           Unknown   Completed           

[facility]

 

                    Influenza High Dose           Unknown   Completed           

[facility]

 

                    Influenza High Dose           Unknown   Completed           

[facility]

 

                    Influenza High Dose           Unknown   Completed           

[facility]

 

                    Influenza High Dose           Unknown   Completed           

[facility]

 

                    Influenza High Dose           Unknown   Completed           

[facility]

 

                    Influenza High Dose           Unknown   Completed           

[facility]

 

                    Influenza High Dose           Unknown   Completed           

[facility]

 

                    Influenza High Dose           Unknown   Completed           

[facility]

 

                    Influenza High Dose           Unknown   Completed           

[facility]

 

                    Influenza High Dose           Unknown   Completed           

[facility]







Vital Signs





                      Vital Name Observation Time Observation Value Comments   S

ource

 

                      Heart rate 2024 14:35:00 65 /min               [facility]

[facility]

 

                      Respiratory rate 2024 14:35:00 13 /min              

 [facility]

 

                                                    Oxygen saturation in 

Arterial blood by 

Pulse oximetry  2024 14:35:00 97 /min                         [facility]

 

                                                    Systolic blood 

pressure        2024 14:34:00 194 mm[Hg]                      [facility]

 

                                                    Diastolic blood 

pressure        2024 14:34:00 55 mm[Hg]                       [facility]

 

                      Body temperature 2024 13:18:00 36.44 Penelope            

 [facility]

 

                      Body height 2024 15:01:00 180.3 cm              [facility]

 

                      Body weight 2024 15:01:00 83.9 kg               [facility]

 

                      BMI        2024 15:01:00 25.81 kg/m2            [facility]

 

                      Heart rate 2024 13:27:00 62 /min               [facility]

 

                      Respiratory rate 2024 13:27:00 18 /min              

 [facility]

 

                                                    Oxygen saturation in 

Arterial blood by 

Pulse oximetry  2024 13:27:00 100 /min                        [facility]

 

                                                    Systolic blood 

pressure        2024 13:23:00 153 mm[Hg]                      [facility]

 

                                                    Diastolic blood 

pressure        2024 13:23:00 53 mm[Hg]                       [facility]

 

                      Body temperature 2024 13:18:00 36.44 Penelope            

 [facility]

 

                      Body height 2024 15:01:00 180.3 cm              [facility]

 

                      Body weight 2024 15:01:00 83.9 kg               [facility]

 

                      BMI        2024 15:01:00 25.81 kg/m2            [facility]

[facility]

 

                                                    Systolic blood 

pressure        2024 18:26:00 162 mm[Hg]                      [facility]

 

                                                    Diastolic blood 

pressure        2024 18:26:00 62 mm[Hg]                       [facility]

 

                      Heart rate 2024 18:26:00 64 /min               [facility]

[facility]

 

                      Body temperature 2024 18:26:00 36.11 Penelope            

 [facility]

 

                      Respiratory rate 2024 18:26:00 19 /min              

 [facility]

 

                      Body weight 2024 18:26:00 81.375 kg             [facility]

 

                      BMI        2024 18:26:00 25.02 kg/m2            [facility]

 

                                                    Oxygen saturation in 

Arterial blood by 

Pulse oximetry  2024 18:26:00 95 /min                         [facility]

 

                      Body height 2024 18:24:00 180.3 cm              [facility]

 

                                                    Systolic blood 

pressure        2023-01-10 20:10:00 160 mm[Hg]                      [facility]

 

                                                    Diastolic blood 

pressure        2023-01-10 20:10:00 52 mm[Hg]                       [facility]

 

                      Heart rate 2023-01-10 20:10:00 55 /min               [facility]

[facility]

 

                      Respiratory rate 2023-01-10 20:02:00 22 /min              

 [facility]

 

                      Body height 2023-01-10 20:02:00 180.3 cm              [facility]

[facility]

 

                      Body weight 2023-01-10 20:02:00 83.87 kg              [facility]

 

                      BMI        2023-01-10 20:02:00 25.79 kg/m2            [facility]

[facility]

 

                                                    Oxygen saturation in 

Arterial blood by 

Pulse oximetry  2023-01-10 20:02:00 97 /min                         [facility]

 

                                                    Systolic blood 

pressure        2023 15:19:00 165 mm[Hg]                      [facility]

 

                                                    Diastolic blood 

pressure        2023 15:19:00 54 mm[Hg]                       [facility]

 

                      Heart rate 2023 15:19:00 57 /min               [facility]

[facility]

 

                      Body height 2023 15:19:00 180.3 cm              [facility]

 

                      Body weight 2023 15:19:00 84.823 kg             [facility]

 

                      BMI        2023 15:19:00 26.08 kg/m2            [facility]

 

                                                    Oxygen saturation in 

Arterial blood by 

Pulse oximetry  2023 15:19:00 94 /min                         [facility]







Procedures





                                        Procedure           Date / Time 

Performed                 Performing Clinician      Source

 

                                                    CAROTID DUPLEX BILATERAL 

- BY VASCULAR LAB   2024 20:20:35 Rafael Mejias       [facility]

 

                                                    VENOUS REFLUX DUPLEX 

BILATERAL - BY VASCULAR 

LAB                 2024 20:20:28 Rafael Mejias       [facility]

 

                                                    INTRATHECAL INFUSION 

PUMP REVISION       2024 12:03:00 Sumeet Mckeon  [facility]

 

                          POCT GLUCOSE (AUTOMATED) 2024 11:53:00 Sumeet Mckeno [facility]

 

                          POCT GLUCOSE (AUTOMATED) 2024 11:53:00 Sumeet Mckeon [facility]

 

                                                    HB ECG ROUTINE & RHYTHM 

STRIP               2024 18:28:08 Rafael Mejias       [facility]

 

                                                    AUTHORIZATION FOR 

RELEASE OF PHI            2023 05:01:00       Doctor Unassigned, No 

Name                                    [facility]

 

                                ASSIGNMENT OF BENEFITS 2023 14:57:31 Docto

r Unassigned, No 

Name                                    [facility]







Encounters





                                                    Start 

Date/Time                               End 

Date/Time                               Encounter 

Type                                    Admission 

Type                                    Attending 

Clinicians                              Care 

Facility                                Care 

Department                              Encounter 

ID                                      Source

 

                                                    2022 

10:37:02                        Outpatient                      [facility]              [id number]                                   [facility] 

[facility]

 

                                                    2024 

00:00:00                                2024 

18:20:22                                Patient 

Secure Msg                                          Doctor 

Unassigned, 

No Name                                 [facility] 

[place]                               [id number]

[id number]

[id number]

[id number]

[id number]                       [id number]                 [facility]

 

                                                    2024 

00:00:00                                2024 

11:17:31            Telephone                               Rudi 

[facility] 

PRIMARY 

AND 

SPECIALTY 

CARE                                    [id number]

[id number]

[id number]

[id number]

205                       [id number]                 [facility]

 

                                                    2024 

14:30:00                                2024 

15:47:56            Outpatient          R                   RUDI 

[facility]            [id number]      [facility]

 

                                                    2024 

14:30:00                                2024 

15:47:56                                Office 

Visit                                               Rudi 

[facility] 

PRIMARY 

AND 

SPECIALTY 

CARE                                    [id number]

[id number]

[id number]

[id number]

[id number]                       [id number]                 [facility]

 

                                                    2024 

14:23:29                                2024 

23:59:00                                Hospital 

Encounter                                           [place]ewayne 

[facility] 

[facility]                                [id number]

[id number]

[id number]

[phone]

[id number]                       [id number]                 [facility]

 

                                                    2024 

14:00:00                                2024 

14:22:00            [facility]patient          R                   [facility]            [id number]      [facility]

 

                                                    2024 

14:00:00                                2024 

14:22:00                                Hospital 

Encounter                                           [place]ewayne 

[facility] 

[facility]                                [id number]

[id number]

[id number]

[id number]

[id number]                       [id number]                 [facility]

 

                                                    2024 

11:00:00                                2024 

11:00:00            [facility]          [facility]            [id number]      [facility]

 

                                                    2024 

06:38:00                                2024 

09:47:00            Outpatient          R                   SUMEET MCKEON          [facility]            [facility]             [id number]      [facility]

 

                                                    2024 

06:38:00                                2024 

09:47:00                                Hospital 

Encounter                                           Sumeet Mckeon                                [facility] 

SURGICAL 

[place]                                  [id number]

[id number]

[id number]

[id number]

071                       [id number]                 [facility]

 

                                                    2024 

07:15:00                                2024 

08:27:00            Surgery                                 Sumeet Mckeon                                [facility] 

SURGICAL 

[place]                                  [id number]

[id number]

[id number]

[phone]

020                       [id number]                 [facility]

 

                                                    2024 

00:00:00                                2024 

00:00:00                                Patient 

Secure Msg                                          Doctor 

Unassigned, 

No Name                                 [facility]                                [id number]

[id number]

[id number]

[id number]

[id number]                       [id number]                 [facility]

 

                                                    2024 

13:20:00                                2024 

13:49:47            Outpatient          R                   [facility]            [id number]      [facility]

 

                                                    2024 

13:20:00                                2024 

13:49:47                                Office 

Visit                                               [place] 

[facility] 

[facility]                                1..840.114

[id number]

[id number]

[id number]

059                       [id number]                 [facility]

 

                                                    2024 

12:15:00                                2024 

12:30:00                                Technician 

Visit                                               Pob, Ad[facility] 

Lab Main

Sumeet Mckeon [facility] 

PROFESSIO

NAL 

BUILDING                                [id number]

[id number]

[id number]

[id number]

[id number]                       [id number]                 [facility]

 

                                                    2024-04-15 

00:00:00                                2024-04-15 

00:00:00            Telephone                               [place]ewayne 

[facility] 

PR[facility]

NAL 

BUILDING                                [id number]

[id number]

[id number]

[id number]

059                       [id number]                 [facility]

 

                                                    2023 

00:00:00                                2023 

00:00:00            Telephone                               [facility] 

[facility]                                [id number]

[id number]

[id number]

[id number]

059                       [id number]                 [facility]

 

                                                    2023 

00:00:00                                2023 

00:00:00                                Orders 

Only                                                Doctor 

Unassigned, 

No Name                                 [facility]                                [id number]

[id number]

[id number]

[id number]

009                       [id number]                 [facility]

 

                                                    2023 

13:42:20                                2023 

23:59:00            Outpatient          R                   [facility]            [id number]      [facility]

 

                                                    2023 

00:00:00                                2023 

00:00:00                                Patient 

Secure Msg                                          Dewayne 

[facility] 

BUILDING                                [id number]

[id number]

[id number]

[id number]

059                       [id number]                 [facility]

 

                                                    2023-01-10 

13:40:00                                2023-01-10 

14:29:53            Outpatient          R                   [facility]            [id number]      [facility]

 

                                                    2023-01-10 

13:40:00                                2023-01-10 

14:29:53                                Office 

Visit                                               [place] 

[facility] 

BUILDING                                [id number]

[id number]

[id number]

[id number]

059                       [id number]                  [facility]

 

                                                    2023 

09:20:00                                2023 

10:58:19                                Office 

Visit                                               Warren Poon                             [facility]?MARIO CHAUDHRY 

MEDICAL 

OFFICE 

BUILDING                                [id number]

[id number]

[id number]

[phone]

092                       [id number]                  [facility]

 

                                                    2023 

09:20:00                                2023 

10:58:19            Outpatient          CHRISTIANO GALLAGHEROCHJOSUE 

WARREN

CATHYWARREN SALAS          [facility]            [id number]      [facility]

 

                                                    2023 

00:00:00                                2023 

00:00:00                                Orders 

Only                                                Doctor 

Unassigned, 

No Name                                 [facility]                                [id number]

[id number]

[id number]

[phone]

009                       [id number]                  [facility]

 

                                                    2017 

06:42:00                                2017 

11:25:00            Outpatient          CHRISTIANO YOHAWADARIUSZT          [facility]            SHARMAINE             [id number]      [facility]







Results





                    Test Description Test Time Test Comments Results   Result Co

mments Source







                                        

 

                                        

 

                                        

 

                                        





[facility] 



                      Test Item  Value      Reference Range Interpretation Comme

nts

 

                      POCT GLU (test code = [id number]) 168 mg/dL       H 

         

 

                                                    Lab Interpretation (test cod

e = 

15796-8)        Abnormal                                        





[facility]



Notes





                          Date/Time    Note         Provider     Source

 

                                        2024 14:00:12 





Formatting of this note might 

be different from the 

original.

Copied from CRM #[id number]. 

Topic: Clinical - Medical 

Advice

>> 2024 1:58 PM 

Patient Access Specialist 

wrote:

Jj Simon is a 

83 year old male. Pt states 

he forgot to tell the

nurse two of the medications 

that he was on :

albuterol-ipratropium  

mcg/actuation inhaler

atorvastatin 20 mg tablet

Electronically signed by 

Sergey Flores at 

2024 11:17 AM CDT

                          Sergey Flores           [facility]

 

                                        2024 13:55:19 





Formatting of this note might 

be different from the 

original.

Cardiac Clearance request 

form signed by Dr. Mejias and 

faxed back to [facility] and 

scanned into chart along with 

fax confirmation.





Electronically signed by 

Gertrude Martin MA at 

2024 1:57 PM CDT

                          Gertrude Martin MA     [facility]

 

                                        2024 13:20:00 





Addended by: RAFAEL MEJIAS MD 

on: 2024 11:00 AM





Modules accepted: Orders







Electronically signed by Rafael Mejias MD at 2024 

11:00 AM CDT

                                                    [facility]

 

                                        2024 13:20:00 





Addended by: RAFAEL MEJIAS MD 

on: 2024 09:56 PM





Modules accepted: Orders







Electronically signed by Rafael Mejias MD at 2024 

9:56 PM CDT

                                                    [facility]

 

                                        2024 12:15:00 





Formatting of this note is 

different from the original.

Images from the original note 

were not included.

Venipuncture collection 

performed by clean technique 

on the left anticubitus.

Total of 1 attempts were 

made. Slight pressure and a 

bandage/dressing were

applied to the site(s). The 

patient experienced no 

complications. The following

specimens were processed 

according to instructions and 

sent to [facility]

laboratories per lab order on 

2024:





LT BLUE

SST

RED

LAV 1

PPT

DK GREEN (LiHep)

DK GREEN (SodH)

GRAY

DK BLUE (K2)

DK BLUE (S)

ACD

Blood Culture

NIPT/NTD







Electronically signed by 

Raya Gunderson at 

2024 11:33 AM CDT

                                                    [facility]

 

                                        2024 11:05:36 





Formatting of this note might 

be different from the 

original.

Images from the original note 

were not included.

Your procedure is at 

[facility] 

on 24. The address is

[address] [place] [address], [id number]. [facility] nursing staff will

call you the workday before 

your procedure to let you 

know what time to

arrive.On the day of your 

procedure, please go inside 

that door and check in at

the desk.

Please note: You may not 

travel home alone and that 

includes in a taxi or by

bus. We must speak to your 

Responsible Adult (who will 

be picking you up) the

morning of your procedure, 

before the start of your 

procedure. This person must

be an adult over the age of 

18 years of age.

Do not eat any solid food 

after midnight the night 

before surgery. You may have

sips of clear liquids such as 

water, gatorade, and sprite 

up until two hours

before your scheduled 

procedure.

You may take your medications 

with a sip of water as 

directed by physician.





Anticoagulants will be per 

physician guidance. 

Medication

Note(s)/Instructions:Patient 

has appointment today with 

cardiology, will

address hold/continue ASA. 

Instructions given to hold 

lisinopril day before and

morning of surgery and hold 

metformin evening before and 

morning of surgery.





Pending screening, we may 

test for COVID. If a patient 

tests positive, their

cases are cancelled and/or 

rescheduled. COVID SCREENING 

NOTE: Denies COVID

symptoms, no testing 

required.





Additional requests, 

questions, concerns:CB number 

provided.





Patient verbalized 

understanding of pre-op 

instructions and voiced no 

further

questions at this time.







Electronically signed by 

Breonna Saavedra RN at 

2024 11:10 AM CDT

                                                    [facility]

 

                                        2024 10:54:33 





Formatting of this note is 

different from the original.

Cardiac Clearance request 

received via fax from 

[facility],

patient's last O/V was 

1/10/2023 and will need an 

appt., clinic notified. Form

scanned into chart.

Notified clinic and stated 

patient stated he would call 

today to try to get a

sooner appt due to pain pump 

will be out in May.

Notified Cardio [facility] to help 

assist patient getting a 

sooner appt.

Electronically signed by 

Gertrude Martin MA at 

2024 10:59 AM CDT

                          Gertrude Martin MA     [facility]

 

                                        2024-04-15 16:07:46 





Formatting of this note might 

be different from the 

original.

Cardiac Clearance request 

received via fax from 

[facility],

patient's last O/V was 

1/10/2023 and will need an 

appt., clinic notified. Form

scanned into chart.

Electronically signed by 

Gertrude Martin MA at 

04/15/2024 4:09 PM ANNEMARIET

                          Gertrude Martin MA     [facility]

## 2024-08-23 NOTE — RAD REPORT
EXAM DESCRIPTION:  CT - Chest Angio - 8/23/2024 2:22 pm

 

CLINICAL HISTORY:  Chest pain.

elevaed troponin

 

COMPARISON:  Chest Single View dated 8/23/2024; Chest Single View dated 7/30/2023

 

TECHNIQUE:  CT angiogram of the pulmonary arteries was performed with MIP.

 

All CT scans are performed using dose optimization technique as appropriate and may include automated
 exposure control or mA/KV adjustment according to patient size.

 

FINDINGS:  No evidence of pulmonary thromboembolism.

 

No acute aortic finding demonstrated.

 

Prominent COPD. There is a significant area of solid lung consolidation left lung base measuring appr
oximately 7 x 7 cm.

 

There is a loculated small left pleural effusion. Trace right pleural effusion.

 

Enlarged lymph nodes are present in the mediastinum and both hilar regions.

 

No concerning bony finding.

 

IMPRESSION:  No evidence of pulmonary thromboembolism.

 

Consolidated lung in the left lung base without air bronchograms with loculated left pleural effusion
 as detailed. This could be neoplastic in etiology. Bronchoscopy would be recommended for further mauri
luation.

 

Trace right pleural effusion.

 

Enlarged lymphadenopathy in the mediastinum and hilar regions bilaterally.

 

COPD.

## 2024-08-24 LAB
ANION GAP SERPL CALC-SCNC: 4.5 MEQ/L (ref 5–15)
BUN BLD-MCNC: 24 MG/DL (ref 7–18)
GLUCOSE SERPLBLD-MCNC: 101 MG/DL (ref 74–106)
HCT VFR BLD CALC: 29.4 % (ref 39.6–49)
HDLC SERPL-MCNC: 61 MG/DL (ref 40–60)
HGB BLD-MCNC: 9.5 G/DL (ref 13.6–17.9)
LDLC SERPL CALC-MCNC: 27 MG/DL (ref ?–130)
LDLC SERPL-MCNC: 27 MG/DL
LYMPHOCYTES # SPEC AUTO: 1.3 K/UL (ref 0.7–4.9)
MAGNESIUM SERPL-MCNC: 1.6 MG/DL (ref 1.6–2.4)
MCH RBC QN AUTO: 30.1 PG (ref 27–35)
MCHC RBC AUTO-ENTMCNC: 32.1 G/DL (ref 32–36)
MCV RBC: 93.7 FL (ref 80–100)
NRBC # BLD: 0 10*3/UL (ref 0–0)
NRBC BLD AUTO-RTO: 0 % (ref 0–0)
PMV BLD: 7.9 FL (ref 7.6–11.3)
POTASSIUM SERPL-SCNC: 4.5 MEQ/L (ref 3.5–5.1)
RBC # BLD: 3.14 M/UL (ref 4.33–5.43)
WBC # BLD AUTO: 7.7 THOU/UL (ref 4.3–10.9)

## 2024-08-24 RX ADMIN — SPIRONOLACTONE SCH MG: 25 TABLET, FILM COATED ORAL at 11:41

## 2024-08-24 RX ADMIN — ALBUMIN (HUMAN) SCH MLS: 5 SOLUTION INTRAVENOUS at 16:42

## 2024-08-24 RX ADMIN — PANTOPRAZOLE SODIUM SCH MG: 40 TABLET, DELAYED RELEASE ORAL at 09:22

## 2024-08-24 NOTE — P.PN
Date of Service: 08/24/24


subjective


Breathing improved, 93% on 2 L no reported chest








Review of Systems 


per HPI





Physical Examination





- Physical Exam


General: Alert, Oriented x3, Mild distress


HEENT: Normocephalic, PERRLA


Neck: 2+ carotid pulse no bruit, JVD not distended


Respiratory: Diminished, Crackles/rales


Cardiovascular: Normal pulses, Regular rate/rhythm, +3 Edema (lower extremity)


Capillary refill: <2 Seconds


Gastrointestinal: Normal bowel sounds, Soft and benign


Musculoskeletal: No swelling, No contractures


Integumentary: No breakdown, No significant lesion


Neurological: Normal speech, Normal strength at 5/5 x4 extr, Sensation intact














Assessment and Plan





- Plan


Assessment plan 


Acute on chronic heart failure unknown baseline 


NSTEMI type II


Elevated BNP 


Extremity edema


Hypertension


Hyperlipidemia


Cardiology consult, telemetry 


Echo ordered, aggressive diuresis -close renal monitoring


Resume home meds 


90% on room air, blood pressure 150/56, 


EKG s 63 beats/min. Rhythm is regular, Normal Sinus Rhythm with Occasional PVCs,

rt  LAFB. Left axis deviation noted. MS interval is normal. QRS interval is 

normal. QT  interval is normal, was treated with 


DC metoprolol start Coreg 6.25 twice daily


Continue aspirin, 81 mg, Lipitor 40 mg





Acute hypoxic respiratory failure secondary to decompensated heart failure 

versus pneumonia


Left lung base bacterial pneumonia


COPD


CT significant area of solid lung consolidation left lung base measuring -

approximately 7 x 7 cm-


CT of the chest no evidence of PE


pulmonary consulted to eval


O2 2 L keep sats greater than 92% 


IV antibiotics, nebs, 





History of falls


PT eval





Chronic pain


As needed analgesia





Chronic c


onstipation


PRNs stool softener





Diabetes non-insulin-dependent


Accu-Cheks, sliding scale insulin





Full code


DVT SCDs


Diet cardiac





Time spent with patient 25-minute





Disposition pending hospital course





<Shi Long - Last Filed: 08/24/24 15:02>





Patient was seen and examined.  Events of the last 24 hours have been noted.  

Spoke with with JASON regarding patient's clinical picture after evaluating and 

examining the patient independently. I performed a substantial part of the MDM 

during this patient's care today. I personally made or approved the documented 

management plan and acknowledge its risk of complications. I agree with the 

findings and documentation provided in the JASON's notes. 








Patient with fluid overload.  Patient diuresed and patient will be admitted for 

acute CHF exacerbation.  Continue monitoring volume status and repeat chest x-

ray.  Continue with antibiotic therapy.  Continue monitoring labs.  Clinically 

doing much better.  Patient output is greater than 5 L.





<Thania Bronson - Last Filed: 08/26/24 03:26>

## 2024-08-24 NOTE — EKG
Test Date:    2024-08-23               Test Time:    21:18:59

Technician:   MALINDA                                     

                                                     

MEASUREMENT RESULTS:                                       

Intervals:                                           

Rate:         67                                     

VA:                                                  

QRSD:         152                                    

QT:           464                                    

QTc:          490                                    

Axis:                                                

P:                                                   

VA:                                                  

QRS:          106                                    

T:            -63                                    

                                                     

INTERPRETIVE STATEMENTS:                                       

                                                     

Normal sinus rhythm

Right bundle branch block

Cannot rule out Inferior infarct, age undetermined

Anterior infarct, age undetermined

T wave abnormality, consider lateral ischemia

Abnormal ECG

Compared to ECG 07/29/2023 00:32:54

Uncertain supraventricular rhythm now present

Right bundle-branch block now present

Myocardial infarct finding now present

T-wave abnormality now present

Possible ischemia now present





Electronically Signed On 08-24-24 13:52:07 CDT by Lalo Byrne

## 2024-08-24 NOTE — P.CNS
Date of Consult: 08/24/24


Reason for Consult: Left-sided pleural effusion


Chief Complaint: Pleural effusion left-sided chest pain


History of Present Illness: 





Patient is 83 years of age very pleasant man very active at baseline apparently 

had fallen recently on his left side hurt his left chest started complaining of 

worsening dyspnea increasing chest discomfort and it appeared in the hospital in

addition patient also complained of enlargement of his scrotum and worsening 

lower extremity edema feeling a little better and is on home oxygen chest x-ray 

shows a pleural effusion may be loculated on the left side with possible 

atelectasis denies any fever chills any productive cough


Allergies





Penicillins Allergy (Verified 07/29/23 06:18)


   Hives/Rash





Home Medications: 








Atorvastatin Calcium [Lipitor*] 0.5 tab PO DAILY 07/29/23 


Carboxymethylcellulose Sodium [Lubricant Eye Drop] 1 drop OP PRN PRN 07/29/23 


Cyanocobalamin (Vitamin B-12) [Vitamin B-12] 1 tab PO DAILY 07/29/23 


Gabapentin 2 tab PO BEDTIME 07/29/23 


Hydralazine [Apresoline*] 1 tab PO BEDTIME 07/29/23 


Hydralazine [Apresoline*] 2 tab PO DAILY WITH BREAKFAST 07/29/23 


Lisinopril [Zestril] 1 tab PO BID 07/29/23 


Metoprolol Succinate [Toprol Xl*] 2 tab PO DAILY 07/29/23 


Omeprazole 1 cap PO BID 07/29/23 


Albuterol Inhaler [Ventolin Inhaler*] 1 puff PO QID PRN 08/23/24 


Aspirin Chewable [Aspirin Chewable*] 1 tab PO DAILY 08/23/24 


Levothyroxine [Synthroid*] 0.05 mg PO DAILY 08/23/24 


Metformin HCl 1,000 mg PO BID 08/23/24 


Morphine *Extended Release* [MS Contin*] 1 tab PO BEDTIME 08/23/24 


Multivitamin 1 each PO DAILY 08/23/24 








- Past Medical/Surgical History


Diabetic: Yes


-: DM


-: CAD


-: Atherosclerosis


-: CHF


-: Back surgery


-: Hip surgery


-: Has stents placed in his neck





- Social History


Smoking Status: Former smoker


Alcohol use: No


CD- Drugs: No


Caffeine use: Yes


Place of Residence: Home





Review of Systems


10-point ROS is otherwise unremarkable





Physical Examination














Temp Pulse Resp BP Pulse Ox


 


 97.7 F   62   18   163/67 H  98 


 


 08/24/24 08:00  08/24/24 08:00  08/24/24 08:00  08/24/24 08:00  08/24/24 08:00








General: Alert, Oriented x3


HEENT: Atraumatic


Neck: Supple


Respiratory: Diminished (Managed on the left side)


Cardiovascular: Normal S1 S2, Edema (2+ edema)


Gastrointestinal: Normal bowel sounds, Soft and benign, Non-distended


Laboratory Data (last 24 hrs)











  08/23/24 08/23/24





  11:38 11:38


 


WBC  10.20 


 


Hgb  8.9 L 


 


Hct  28.0 L 


 


Plt Count  247 


 


Sodium   133 L


 


Potassium   5.1


 


BUN   18


 


Creatinine   1.30


 


Glucose   167 H


 


Magnesium   1.7


 


Total Bilirubin   0.3


 


AST   24


 


ALT   21


 


Alkaline Phosphatase   61











- Problems


(1) Pleural effusion


Current Visit: Yes   Status: Acute   


Plan: 


Patient is 83 years of age admitted with a recent fall left-sided chest 

discomfort he appears to have a loculated pleural effusion on the left side only

some atelectasis or pneumonia is any fever or chills there is no white count pa

marcont seems to be improving has never smoked have underlying chronic renal 

failure x-ray CT scan reviewed his vital signs are stable troponins were mildly 

elevated echocardiogram done in July 2023 shows normal left ventricular ejection

fraction is improving continue with Lasix incentive spirometry room air pulse ox

DC antibiotics ambulate add low-dose spironolactone renal function continue to 

monitor needed thoracentesis

## 2024-08-24 NOTE — P.PN
Subjective


Date of Service: 08/24/24


Chief Complaint: Pleural effusion left-sided chest pain


Subjective: No new changes, No C/O voiced, Tolerating diet, Ambulating, 

Improving





Review of Systems


10-point ROS is otherwise unremarkable





Physical Examination





- Vital Signs


Temperature: 98.0 F


Blood Pressure: 130/63


Pulse: 66


Respirations: 16


Pulse Ox (%): 100





- Physical Exam


General: Alert, In no apparent distress


HEENT: Atraumatic, PERRLA, EOMI


Neck: Supple, JVD not distended


Respiratory: Clear to auscultation bilaterally, Normal air movement


Cardiovascular: Regular rate/rhythm, Normal S1 S2, Edema


Gastrointestinal: Normal bowel sounds, No tenderness


Musculoskeletal: No tenderness


Integumentary: No rashes


Neurological: Normal speech, Normal tone, Normal affect


Lymphatics: No axilla or inguinal lymphadenopathy





- Studies


Medications List Reviewed: Yes





Assessment And Plan





- Current Problems (Diagnosis)


(1) Acute heart failure


Current Visit: Yes   Status: Acute   


Plan: 


unsure about EF, patient is NYHA 4, with bilateral +3 llower extremities edema,


Lasix 60 mg IV Q8 hours


Monitor input and output


monitor and correct electrolytes


hold lisinopril


get Echo











(2) Elevated troponin


Current Visit: No   Status: Acute   


Plan: 


most likely type 2 MI from acute CHF,


Continue to trend cardiac enzymes


get echo


ASA 81 mg daily


Lipitor 40 mg daily











(3) Hypertension


Current Visit: No   Status: Acute   


Plan: 


D/C metoprolol


Coreg 6.25 mg po BID and uptitrate as tolerated.

## 2024-08-24 NOTE — RAD REPORT
EXAM DESCRIPTION:  RAD - Chest Lateral Decubitus - 8/24/2024 6:40 pm

 

CLINICAL HISTORY:  Left sided pleural effusion

 

COMPARISON:  Chest Single View dated 8/23/2024; Chest Angio dated 8/23/2024

 

FINDINGS:  Left pleural effusion is present measuring up to 4.6 cm in greatest thickness. Mild locula
tion suspected. No significant right-sided effusion.

## 2024-08-25 LAB
ANION GAP SERPL CALC-SCNC: 7.2 MEQ/L (ref 5–15)
BUN BLD-MCNC: 25 MG/DL (ref 7–18)
GLUCOSE SERPLBLD-MCNC: 141 MG/DL (ref 74–106)
HCT VFR BLD CALC: 28.4 % (ref 39.6–49)
HGB BLD-MCNC: 9.1 G/DL (ref 13.6–17.9)
HYPOCHROMIA BLD QL: (no result)
LYMPHOCYTES # SPEC AUTO: 1.4 K/UL (ref 0.7–4.9)
MAGNESIUM SERPL-MCNC: 1.6 MG/DL (ref 1.6–2.4)
MCH RBC QN AUTO: 29.5 PG (ref 27–35)
MCHC RBC AUTO-ENTMCNC: 32 G/DL (ref 32–36)
MCV RBC: 92.3 FL (ref 80–100)
MORPHOLOGY BLD-IMP: (no result)
NEUTROPHILS NFR FLD MANUAL: 60 % (ref 40–80)
NRBC # BLD: 0.3 10*3/UL (ref 0–0)
NRBC BLD AUTO-RTO: 3.3 % (ref 0–0)
PMV BLD: 8.2 FL (ref 7.6–11.3)
POTASSIUM SERPL-SCNC: 4.2 MEQ/L (ref 3.5–5.1)
RBC # BLD: 3.08 M/UL (ref 4.33–5.43)
TOTAL CELLS COUNTED SPEC: 100
WBC # BLD AUTO: 8.1 THOU/UL (ref 4.3–10.9)

## 2024-08-25 RX ADMIN — MAGNESIUM SULFATE IN DEXTROSE ONE MLS: 10 INJECTION, SOLUTION INTRAVENOUS at 10:13

## 2024-08-25 NOTE — P.PN
Date of Service: 08/25/24


subjective


Breathing improved, 93% on 2 L no reported chest


Shortness of breath, chest pain improved admitted for NSTEMI





Review of Systems 


per HPI





Physical Examination





- Physical Exam


General: Alert, Oriented x3, no acute distress noted


HEENT: Normocephalic, PERRLA


Neck: 2+ carotid pulse no bruit, JVD not distended


Respiratory: Diminished, Crackles/rales, unlabored


Cardiovascular: Normal pulses, Regular rate/rhythm, Edema (lower extremity)


Capillary refill: <2 Seconds


Gastrointestinal: Normal bowel sounds, Soft and benign


Musculoskeletal: No swelling, No contractures


Integumentary: No breakdown, No significant lesion


Neurological: Normal speech, Normal strength at 5/5 x4 extr, Sensation intact














Assessment and Plan





- Plan


Assessment plan 


Acute on chronic heart failure unknown baseline 


NSTEMI type II


Elevated BNP 


Extremity edema


Hypertension


Hyperlipidemia


Cardiology consult, telemetry 


Echo ordered, aggressive diuresis -close renal monitoring


Resume home meds 


90% on room air, blood pressure 150/56, 


EKG s 63 beats/min. Rhythm is regular, Normal Sinus Rhythm with Occasional PVCs,

rt  LAFB. Left axis deviation noted. ME interval is normal. QRS interval is 

normal. QT  interval is normal, was treated with 


DC metoprolol start Coreg 6.25 twice daily


Continue aspirin, 81 mg, Lipitor 40 mg


Echo in the a.m.





Acute hypoxic respiratory failure secondary to decompensated heart failure 

versus pneumonia


Left lung base bacterial pneumonia


COPD


CT significant area of solid lung consolidation left lung base measuring -

approximately 7 x 7 cm-


CT of the chest no evidence of PE


pulmonary consulted to eval


O2 2 L keep sats greater than 92% 


IV antibiotics, nebs, 





History of falls


PT eval





Chronic pain


As needed analgesia





constipation


PRNs stool softener





Diabetes non-insulin-dependent


Accu-Cheks, sliding scale insulin





Full code


DVT SCDs


Diet cardiac





Time spent with patient 25 minute





Disposition pending hospital course





<Shi Long - Last Filed: 08/25/24 15:37>





Patient was seen and examined.  Events of the last 24 hours have been noted.  

Spoke with with JASON regarding patient's clinical picture after evaluating and 

examining the patient independently. I performed a substantial part of the MDM 

during this patient's care today. I personally made or approved the documented 

management plan and acknowledge its risk of complications. I agree with the 

findings and documentation provided in the JASON's notes. 








Patient with fluid overload.  Patient is clinically doing much better.  Patient 

greater than 7 L of urine output and 5 pound of fluid has been removed.  

Patient's feeling much better.  Still having some chest discomfort.  Will repeat

chest x-ray today and anticipate discharge in the morning.  Patient diuresed and

patient will be admitted for acute CHF exacerbation.  Continue monitoring volume

status and repeat chest x-ray. Continue with pain meds





<Thania Bronson - Last Filed: 08/26/24 03:32>

## 2024-08-25 NOTE — RAD REPORT
EXAM DESCRIPTION:  RADChest Single View8/25/2024 6:39 am

 

CLINICAL HISTORY:  pneumonia

 

COMPARISON:  Chest Single View dated 8/23/2024; Chest Single View dated 7/30/2023; Chest Single View 
dated 7/28/2023; Chest Single View dated 7/20/2023Chest Single View dated 8/23/2024; Chest Single Vie
w dated 7/30/2023; Chest Single View dated 7/28/2023; Chest Single View dated 7/20/2023

 

TECHNIQUE:   Portable AP view of the chest.

 

FINDINGS:  Persistent patchy left basilar airspace opacity with suggestion of small effusion which ma
y be improving.  No pneumothorax or right-sided effusion. The cardiomediastinal contours are unremark
able.

 

IMPRESSION:  Patchy left basilar airspace opacity persists. Small left effusion, may be improving sin
ce the prior exam.

## 2024-08-25 NOTE — P.PN
Subjective


Date of Service: 08/25/24


Chief Complaint: Pleural effusion left-sided chest pain


Subjective: No new changes, No C/O voiced, Tolerating diet, Ambulating, 

Improving





Review of Systems


10-point ROS is otherwise unremarkable





Physical Examination





- Vital Signs


Temperature: 98.3 F


Blood Pressure: 152/55


Pulse: 60


Respirations: 16


Pulse Ox (%): 92





- Physical Exam


General: Alert, In no apparent distress


HEENT: Atraumatic, PERRLA, EOMI


Neck: Supple, JVD not distended


Respiratory: Diminished, Crackles/rales


Cardiovascular: Regular rate/rhythm, Normal S1 S2, Edema


Gastrointestinal: Normal bowel sounds, No tenderness


Musculoskeletal: No tenderness


Integumentary: No rashes


Neurological: Normal speech, Normal tone, Normal affect


Lymphatics: No axilla or inguinal lymphadenopathy





- Studies


Medications List Reviewed: Yes





Assessment And Plan





- Current Problems (Diagnosis)


(1) Acute heart failure


Current Visit: Yes   Status: Acute   


Plan: 


unsure about EF, patient is NYHA 4, with bilateral +3 llower extremities edema,


Lasix 60 mg IV Q8 hours


Monitor input and output


monitor and correct electrolytes


hold lisinopril


get Echo











(2) Elevated troponin


Current Visit: No   Status: Acute   


Plan: 


most likely type 2 MI from acute CHF,


Continue to trend cardiac enzymes


get echo


ASA 81 mg daily


Lipitor 40 mg daily











(3) Hypertension


Current Visit: No   Status: Acute   


Plan: 


D/C metoprolol


Increase Coreg to 12.5 mg po BID and uptitrate as tolerated.

## 2024-08-26 LAB
ANION GAP SERPL CALC-SCNC: 5.1 MEQ/L (ref 5–15)
APTT BLD: 33.3 SECONDS (ref 24.3–36.9)
BUN BLD-MCNC: 25 MG/DL (ref 7–18)
GLUCOSE SERPLBLD-MCNC: 170 MG/DL (ref 74–106)
HCT VFR BLD CALC: 31.2 % (ref 39.6–49)
HGB BLD-MCNC: 10 G/DL (ref 13.6–17.9)
INR BLD: 1.04
LYMPHOCYTES # SPEC AUTO: 1.7 K/UL (ref 0.7–4.9)
MAGNESIUM SERPL-MCNC: 1.7 MG/DL (ref 1.6–2.4)
MCH RBC QN AUTO: 29.4 PG (ref 27–35)
MCHC RBC AUTO-ENTMCNC: 32.1 G/DL (ref 32–36)
MCV RBC: 91.8 FL (ref 80–100)
NRBC # BLD: 0 10*3/UL (ref 0–0)
NRBC BLD AUTO-RTO: 0 % (ref 0–0)
PMV BLD: 8.4 FL (ref 7.6–11.3)
POTASSIUM SERPL-SCNC: 4.1 MEQ/L (ref 3.5–5.1)
PROTHROMBIN TIME: 11.6 SECONDS (ref 9.4–12.5)
RBC # BLD: 3.4 M/UL (ref 4.33–5.43)
WBC # BLD AUTO: 8.4 THOU/UL (ref 4.3–10.9)

## 2024-08-26 RX ADMIN — MAGNESIUM SULFATE IN DEXTROSE ONE MLS: 10 INJECTION, SOLUTION INTRAVENOUS at 10:17

## 2024-08-26 RX ADMIN — METHYLPREDNISOLONE SODIUM SUCCINATE SCH MG: 125 INJECTION, POWDER, FOR SOLUTION INTRAMUSCULAR; INTRAVENOUS at 05:00

## 2024-08-26 RX ADMIN — MAGNESIUM SULFATE IN DEXTROSE ONE: 10 INJECTION, SOLUTION INTRAVENOUS at 06:26

## 2024-08-26 NOTE — RAD REPORT
EXAM DESCRIPTION:  RAD - Ribs  Left - 8/26/2024 10:02 am

 

CLINICAL HISTORY:  Rib fx

 

COMPARISON:  Chest Single View dated 8/26/2024

 

TECHNIQUE:  Left ribs, 3 views.

 

FINDINGS:  Mild periosteal reaction near the costochondral junction of the left seventh rib, may sugg
est a healing fracture. No other acute displaced rib fracture are evident.

 

No aggressive rib lesion. No underlying pneumothorax, effusion, infiltrate or pulmonary contusion.

 

IMPRESSION:  Suggestion of a healing fracture near the left seventh rib costochondral junction.

## 2024-08-26 NOTE — P.PN
Subjective


Date of Service: 08/26/24


Chief Complaint: Pleural effusion left-sided chest pain


Subjective: Improving (Patient is improving chest discomfort and shortness of 

breath have improved)





Review of Systems


General: Weakness


Respiratory: Shortness of Breath





Physical Examination





- Vital Signs


Temperature: 97.9 F


Blood Pressure: 133/55


Pulse: 65


Respirations: 16


Pulse Ox (%): 92





- Physical Exam


General: Alert, Oriented x3


Respiratory: Diminished (The left side)


Gastrointestinal: Normal bowel sounds, Soft and benign





- Studies


Medications List Reviewed: Yes





Assessment And Plan





- Current Problems (Diagnosis)


(1) Pleural effusion


Current Visit: Yes   Status: Acute   


Plan: 


Patient is 83 years of age admitted with a recent fall left-sided pleural 

effusion decubitus x-rays do show a left-sided pleural effusion the effusion 

does not appear to be loculated recommend thoracentesis he does have an 

infection only has underlying congestive heart failure can change to p.o. Lasix 

with low-dose spironolactone renal function is slightly worse for discharge

## 2024-08-26 NOTE — EKG
Test Date:    2024-08-26               Test Time:    10:45:04

Technician:   YASSINE                                   

                                                     

MEASUREMENT RESULTS:                                       

Intervals:                                           

Rate:         62                                     

MA:                                                  

QRSD:         152                                    

QT:           480                                    

QTc:          487                                    

Axis:                                                

P:                                                   

MA:                                                  

QRS:          106                                    

T:            -60                                    

                                                     

INTERPRETIVE STATEMENTS:                                       

                                                     

Normal sinus rhythm

Rightward axis

Nonspecific intraventricular block

Cannot rule out Inferior infarct, age undetermined

Abnormal ECG

Compared to ECG 08/23/2024 21:18:59

Right-axis deviation now present

Sinus rhythm no longer present

Right bundle-branch block no longer present

T-wave abnormality no longer present

Possible ischemia no longer present

Myocardial infarct finding still present



Electronically Signed On 08-26-24 12:37:43 CDT by Lalo Byrne

## 2024-08-26 NOTE — RAD REPORT
EXAM DESCRIPTION:  RADChest Single View8/26/2024 10:02 am

 

CLINICAL HISTORY:  CHF

 

COMPARISON:  Chest Single View dated 8/25/2024; Chest Single View dated 8/23/2024; Chest Single View 
dated 7/30/2023; Chest Single View dated 7/28/2023

 

TECHNIQUE:   Portable AP view of the chest.

 

FINDINGS:  Stable patchy left basilar airspace opacity with suggestion of small effusion. Right lung 
remains clear.  No pneumothorax. The cardiomediastinal contours are unremarkable.

 

IMPRESSION:  Stable left basilar airspace opacity, concerning for pneumonia. .

## 2024-08-26 NOTE — P.PN
Subjective


Date of Service: 08/26/24


Chief Complaint: Pleural effusion left-sided chest pain





Pt is resting comfortably in bed while doing Echo. Pulm is recommending 

thoracentesis. Will repeat CXR tomorrow to see if the fluid collection is 

drainable by IR.  Continue diuretic. No other complaints. 





Review of Systems


General: Unremarkable


Eyes: Unremarkable


ENT: Unremarkable


Respiratory: Unremarkable


Cardiovascular: Unremarkable


Gastrointestinal: Unremarkable


Genitourinary: Unremarkable


Musculoskeletal: Unremarkable


Integumentary: Unremarkable


Neurological: Unremarkable


Lymphatics: Unremarkable





Physical Examination





- Vital Signs


Temperature: 97.9 F


Blood Pressure: 133/55


Pulse: 65


Respirations: 16


Pulse Ox (%): 92





- Physical Exam


General: Alert, In no apparent distress, Oriented x3


HEENT: Atraumatic, Normocephalic, PERRLA


Neck: Supple, 2+ carotid pulse no bruit


Respiratory: Clear to auscultation bilaterally, Normal air movement


Cardiovascular: No edema, Normal pulses, Regular rate/rhythm, Normal S1 S2


Capillary refill: <2 Seconds


Gastrointestinal: Normal bowel sounds, Soft and benign, Non-distended


Musculoskeletal: No clubbing, No swelling, No contractures


Integumentary: No rashes, No breakdown, No significant lesion


Neurological: Normal gait, Normal speech, Normal strength at 5/5 x4 extr


Lymphatics: No axilla or inguinal lymphadenopathy





- Studies


Medications List Reviewed: Yes





Assessment And Plan





- Plan





Acute on chronic heart [place] [address]. Will continue 

lasix 60mg iv TID, strict I/O and daily weight. Pt vladimir trace leg edema.





NSTEMI type II: Pt denies any chest pain. Will trend troponin. Continue coreg, 

aspirin, and f/u EKG





Hypertension: Continue hoem med





Hyperlipidemia: Statin.





Acute hypoxic respiratory failure secondary to decompensated heart failure 

versus pneumonia. CT significant area of solid lung consolidation left lung base

measuring -approximately 7 x 7 cm. CTA chest is negative for PE. Pulm is 

recommending Thoracentesis. Will f/u CXR in am to see if the fluid collection is

drainable by IR.





Left lung base bacterial pneumonia: Continue abx and f/u blood cx.





COPD: Continue steroid, abx and duoneb





History of falls: Continue PT.





Chronic pain: continue prn pain med.





Constipation: Continue prn stool softener





Diabetes non-insulin-dependent: Continue accuchek, SSI and ADA. diet.





Code: Full code





DVT ppx: SCDs





Code: full

## 2024-08-26 NOTE — P.PN
Subjective


Date of Service: 08/26/24


Chief Complaint: Pleural effusion left-sided chest pain


Subjective: No new changes, No C/O voiced, Tolerating diet, Ambulating, 

Improving





Review of Systems


10-point ROS is otherwise unremarkable





Physical Examination





- Vital Signs


Temperature: 97.9 F


Blood Pressure: 133/55


Pulse: 65


Respirations: 16


Pulse Ox (%): 92





- Physical Exam


General: Alert, In no apparent distress


HEENT: Atraumatic, PERRLA, EOMI


Neck: Supple, JVD not distended


Respiratory: Clear to auscultation bilaterally, Normal air movement


Cardiovascular: Regular rate/rhythm, Normal S1 S2


Gastrointestinal: Normal bowel sounds, No tenderness


Musculoskeletal: No tenderness


Integumentary: No rashes


Neurological: Normal speech, Normal tone, Normal affect


Lymphatics: No axilla or inguinal lymphadenopathy





- Studies


Medications List Reviewed: Yes





Assessment And Plan





- Current Problems (Diagnosis)


(1) Acute heart failure


Current Visit: Yes   Status: Acute   


Plan: 


unsure about EF, patient is NYHA 4, with bilateral +3 llower extremities edema,


Lasix 60 mg IV Q8 hours


Monitor input and output


monitor and correct electrolytes


hold lisinopril


get Echo











(2) Elevated troponin


Current Visit: No   Status: Acute   


Plan: 


most likely type 2 MI from acute CHF,


Continue to trend cardiac enzymes


get echo


ASA 81 mg daily


Lipitor 40 mg daily











(3) Hypertension


Current Visit: No   Status: Acute   


Plan: 


D/C metoprolol


Increase Coreg to 12.5 mg po BID and uptitrate as tolerated.

## 2024-08-27 VITALS — DIASTOLIC BLOOD PRESSURE: 55 MMHG | TEMPERATURE: 97.7 F | SYSTOLIC BLOOD PRESSURE: 148 MMHG

## 2024-08-27 VITALS — OXYGEN SATURATION: 96 %

## 2024-08-27 LAB
ANION GAP SERPL CALC-SCNC: 6.4 MEQ/L (ref 5–15)
BUN BLD-MCNC: 26 MG/DL (ref 7–18)
GLUCOSE SERPLBLD-MCNC: 198 MG/DL (ref 74–106)
POTASSIUM SERPL-SCNC: 5.4 MEQ/L (ref 3.5–5.1)

## 2024-08-27 RX ADMIN — FUROSEMIDE SCH MG: 40 TABLET ORAL at 09:34

## 2024-08-27 NOTE — RAD REPORT
EXAM DESCRIPTION:  Federico Single View8/27/2024 3:07 pm

 

CLINICAL HISTORY:  Shortness breath

 

COMPARISON:  August 26, 2024

 

FINDINGS:  No significant change since the prior exam. Left basilar opacity likely represents combina
tion of small to moderate pleural effusion and atelectasis or mass

 

The right lung appears clear.

 

Heart remains enlarged

## 2024-08-27 NOTE — ECHO
HEIGHT: 5 ft 11 in   WEIGHT: 195 lb 11.2 oz   DATE OF STUDY: 08/262024   REFER DR: Shi Long

2-DIMENSIONAL: YES

     M.MODE: YES

 DOPPLER: YES

COLOR FLOW: YES



                    TDS:  

PORTABLE: YES

 DEFINITY:  

BUBBLE STUDY: 





DIAGNOSIS:  ELEVATED BNP



CARDIAC HISTORY:  

CATHERIZATION: NO

SURGERY: NO

PROSTHETIC VALVE: NO

PACEMAKER: NO





MEASUREMENTS (cm)

    DIASTOLIC (NORMALS)                 SYSTOLIC (NORMALS)

IVSd                 1.3 (0.6-1.2)                    LA Diam 3.5 (1.9-4.0)     LVEF       
  40-45%  

LVIDd               4.5 (3.5-5.7)                        LVIDs      3.3 (2.0-3.5)     %FS  
        27%

LVPWd             1.3 (0.6-1.2)

Ao Diam           3.5 (2.0-3.7)



2 DIMENSIONAL ASSESSMENT:

RIGHT ATRIUM:                   NORMAL

LEFT ATRIUM:       NORMAL



RIGHT VENTRICLE:            NORMAL

LEFT VENTRICLE: MILD LEFT VENTRICULAR HYPERTROPHY



TRICUSPID VALVE:             NORMAL

MITRAL VALVE:     MILD MITRAL REGURGITATION



PULMONIC VALVE:             NORMAL

AORTIC VALVE:     MILD AORTIC REGURGITATION



PERICARDIAL EFFUSION: NONE

AORTIC ROOT:      NORMAL





LEFT VENTRICULAR WALL MOTION:     MILD GLOBAL HYPOKINESIS, APEX MODERATE HYPOKINESIS, 
PARADOXICAL SEPTAL MOTION



DOPPLER/COLOR FLOW:     GRADE I DIASTOLIC DYSFUNCTION



COMMENTS:      

  1. MILD REDUCED LEFT VENTRICULAR SYSTOLIC FUNCTION, EJECTION FRACTION 40-45%, 

MILD GLOBAL HYPOKINESIS, APICAL SEGMENT MODERATE HYPOKINESIS

  2. GRADE I DIASTOLIC DYSFUNCTION

  3. LARGE PLEURAL EFFUSION



TECHNOLOGIST:   JAGDEEP BALDERAS

## 2024-08-27 NOTE — P.PN
Subjective


Date of Service: 08/27/24


Chief Complaint: Pleural effusion left-sided chest pain


Subjective: No new changes, No C/O voiced, Tolerating diet, Ambulating, 

Improving





Review of Systems


10-point ROS is otherwise unremarkable





Physical Examination





- Vital Signs


Temperature: 97.7 F


Blood Pressure: 148/55


Pulse: 63


Respirations: 16


Pulse Ox (%): 96





- Physical Exam


General: Alert, In no apparent distress


HEENT: Atraumatic, PERRLA, EOMI


Neck: Supple, JVD not distended


Respiratory: Clear to auscultation bilaterally, Normal air movement


Cardiovascular: Regular rate/rhythm, Normal S1 S2, Edema


Gastrointestinal: Normal bowel sounds, No tenderness


Musculoskeletal: No tenderness


Integumentary: No rashes


Neurological: Normal speech, Normal tone, Normal affect


Lymphatics: No axilla or inguinal lymphadenopathy





- Studies


Medications List Reviewed: Yes





Assessment And Plan





- Current Problems (Diagnosis)


(1) Acute heart failure


Current Visit: Yes   Status: Acute   


Plan: 


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 

pressures, patient is NYHA 4, stage C, with bilateral +3 lower extremities 

edema,


Lasix 60 mg IV Q8 hours


Monitor input and output


monitor and correct electrolytes


hold lisinopril











(2) Elevated troponin


Current Visit: No   Status: Acute   


Plan: 


most likely type 2 MI from acute CHF,


Continue to trend cardiac enzymes


ASA 81 mg daily


Lipitor 40 mg daily











(3) Hypertension


Current Visit: No   Status: Acute   


Plan: 


D/C metoprolol


increase Coreg to 12.5 mg po BID and uptitrate as tolerated.

## 2024-08-27 NOTE — P.PN
Subjective


Date of Service: 08/27/24


Chief Complaint: Pleural effusion left-sided chest pain





Pt is resting comfortably in bed while doing Echo. Pulm is recommending 

thoracentesis. Will f/u repeat CXR to see if the fluid collection is drainable 

by IR.  Continue diuretic. No other complaints. 





Review of Systems


General: Unremarkable


Eyes: Unremarkable


ENT: Unremarkable


Respiratory: Unremarkable


Cardiovascular: Unremarkable


Gastrointestinal: Unremarkable


Genitourinary: Unremarkable


Musculoskeletal: Unremarkable


Integumentary: Unremarkable


Neurological: Unremarkable


Lymphatics: Unremarkable





Physical Examination





- Vital Signs


Temperature: 97.7 F


Blood Pressure: 148/55


Pulse: 63


Respirations: 16


Pulse Ox (%): 96





- Physical Exam


General: Alert, In no apparent distress, Oriented x3


HEENT: Atraumatic, Normocephalic, PERRLA


Neck: Supple, 2+ carotid pulse no bruit, JVD not distended


Respiratory: Clear to auscultation bilaterally, Normal air movement, Diminished


Cardiovascular: No edema, Normal pulses, Regular rate/rhythm


Capillary refill: <2 Seconds


Gastrointestinal: Normal bowel sounds, Soft and benign, Non-distended


Musculoskeletal: No clubbing, No swelling, No contractures


Integumentary: No rashes, No breakdown, No significant lesion


Neurological: Normal gait, Normal speech, Normal strength at 5/5 x4 extr, Normal

tone


Lymphatics: No axilla or inguinal lymphadenopathy





- Studies


Medications List Reviewed: Yes





Assessment And Plan





- Plan





Acute on chronic heart failure unknown baseline: [place] is [address]. Will continue 

lasix 60mg iv TID, strict I/O and daily weight. Pt has trace leg edema.





NSTEMI type II: Pt denies any chest pain. Will trend troponin. Continue coreg, 

aspirin, and f/u EKG





Hypertension: Continue hoem med





Hyperlipidemia: Statin.





Acute hypoxic respiratory failure secondary to decompensated heart failure 

versus pneumonia. CT significant area of solid lung consolidation left lung base

measuring -approximately 7 x 7 cm. CTA chest is negative for PE. Pulm is 

recommending Thoracentesis. Will f/u CXR to decide whether he needs 

thoracentesis by IR.





Left lung base bacterial pneumonia: Continue abx and f/u blood cx.





COPD: Continue steroid, abx and duoneb





History of falls: Continue PT.





Chronic pain: continue prn pain med.





Constipation: Continue prn stool softener





Diabetes non-insulin-dependent: Continue accuchek, SSI and ADA. diet.





Code: Full code





DVT ppx: SCDs





Code: full





Dispo: Pending hospital course

## 2024-08-27 NOTE — P.DS
Admission Date: 08/23/24


Discharge Date: 08/27/24


Disposition: ROUTINE DISCHARGE


Discharge Condition: GOOD


Reason for Admission: Pleural effusion left-sided chest pain


Brief History of Present Illness: 





83-year-old male with a past medical history of COPD, hypertension, diabetes, 

congestive heart failure, non-insulin-dependent diabetes, presents to the 

emergency room with shortness of breath.  He reports associated bilateral leg 

swelling.  He reports shortness of breath worse with exertion, worse while 

laying flat, he denies chest pain, abdominal pain, nausea vomiting diarrhea, 

fever.  Plan to admit for NSTEMI, acute on chronic heart failure, acute hypoxic 

respiratory failure from diastolic heart failure, bacterial pneumonia, on 

telemetry, IV antibiotics, diuretics, with cardiology to consult. ER evaluation 

90% on room air, blood pressure 150/56, EKG s 63 beats/min. Rhythm is regular, 

Normal Sinus Rhythm with Occasional PVCs, rt  LAFB. Left axis deviation noted. 

MT interval is normal. QRS interval is normal. QT  interval is normal, was 

treated with 40 mg of Lasix, Levaquin 750,


Hospital Course: 





Pt is an 82yo male with a past medical history of COPD, hypertension, diabetes, 

congestive heart failure, and non-insulin-dependent diabetes who presented with 

shortness of breath and bilateral leg swelling. On admission, lab studies showed

elevated troponin. He was admitted for NSTEMI, acute on chronic heart failure, 

acute hypoxic respiratory failure from diastolic heart failure, bacterial 

pneumonia. We continued lasix, iv antibiotics, and consulted cardiology. Pt 

diuresed well. CT chest showed significant area of solid lung consolidation left

lung base measuring -approximately 7 x 7 cm. CTA chest was negative for PE. Echo

showed EF 40 - 45% and large left pleural effusion and Pulm recommended 

thoracentesis. CXR did not show any pleural effusion. Pulmonologist canceled the

thoracentesis and advised pt to follow up on outpt. We continued home med for 

other chronic medical problems. Pt was in NAD prior to discharge.


Vital Signs/Physical Exam: 














Temp Pulse Resp BP Pulse Ox


 


 97.7 F   63   16   148/55 H  96 


 


 08/27/24 14:19  08/27/24 14:19  08/27/24 14:19  08/27/24 14:19  08/27/24 14:19








Laboratory Data at Discharge: 














WBC  8.40 thou/uL (4.3-10.9)   08/26/24  05:12    


 


Hgb  10.0 g/dL (13.6-17.9)  L D 08/26/24  05:12    


 


Hct  31.2 % (39.6-49.0)  L  08/26/24  05:12    


 


Plt Count  230 thou/uL (152-406)   08/26/24  05:12    


 


PT  11.6 SECONDS (9.4-12.5)   08/26/24  13:00    


 


INR  1.04   08/26/24  13:00    


 


APTT  33.3 SECONDS (24.3-36.9)   08/26/24  13:00    


 


Sodium  135 mEq/L (136-145)  L  08/27/24  13:18    


 


Potassium  5.4 mEq/L (3.5-5.1)  H  08/27/24  13:18    


 


BUN  26 mg/dL (7-18)  H  08/27/24  13:18    


 


Creatinine  1.53 mg/dL (0.70-1.30)  H  08/27/24  13:18    


 


Glucose  198 mg/dL ()  H  08/27/24  13:18    


 


Magnesium  1.7 mg/dL (1.6-2.4)   08/26/24  05:12    


 


Total Bilirubin  0.3 mg/dL (0.2-1.0)   08/23/24  11:38    


 


AST  24 U/L (15-37)   08/23/24  11:38    


 


ALT  21 U/L (16-61)   08/23/24  11:38    


 


Alkaline Phosphatase  61 U/L ()   08/23/24  11:38    


 


Triglycerides  80 mg/dL (<150)   08/24/24  07:42    


 


Cholesterol  104 mg/dL (<200)   08/24/24  07:42    


 


HDL Cholesterol  61 mg/dL (40-60)  H  08/24/24  07:42    


 


Cholesterol/HDL Ratio  1.70   08/24/24  07:42    








Home Medications: 








Atorvastatin Calcium [Lipitor*] 0.5 tab PO DAILY 07/29/23 


Carboxymethylcellulose Sodium [Lubricant Eye Drop] 1 drop OP PRN PRN 07/29/23 


Cyanocobalamin (Vitamin B-12) [Vitamin B-12] 1 tab PO DAILY 07/29/23 


Gabapentin 2 tab PO BEDTIME 07/29/23 


Hydralazine [Apresoline*] 1 tab PO BEDTIME 07/29/23 


Hydralazine [Apresoline*] 2 tab PO DAILY WITH BREAKFAST 07/29/23 


Lisinopril [Zestril] 1 tab PO BID 07/29/23 


Metoprolol Succinate [Toprol Xl*] 2 tab PO DAILY 07/29/23 


Omeprazole 1 cap PO BID 07/29/23 


Albuterol Inhaler [Ventolin Inhaler*] 1 puff PO QID PRN 08/23/24 


Aspirin Chewable [Aspirin Chewable*] 1 tab PO DAILY 08/23/24 


Levothyroxine [Synthroid*] 0.05 mg PO DAILY 08/23/24 


Metformin HCl 1,000 mg PO BID 08/23/24 


Morphine *Extended Release* [MS Contin*] 1 tab PO BEDTIME 08/23/24 


Multivitamin 1 each PO DAILY 08/23/24 


Furosemide [Lasix*] 40 mg PO BIDL #60 tab 08/26/24 


Spironolactone [Aldactone*] 25 mg PO DAILY #30 tab 08/26/24 


carvediloL [Coreg*] 6.25 mg PO BID #60 tab 08/26/24 


predniSONE [Deltasone] 20 mg PO BID #11 tab 08/26/24 





New Medications: 


Spironolactone [Aldactone*] 25 mg PO DAILY #30 tab


carvediloL [Coreg*] 6.25 mg PO BID #60 tab


Furosemide [Lasix*] 40 mg PO BIDL #60 tab


predniSONE [Deltasone] 20 mg PO BID #11 tab


Physician Discharge Instructions: 


-DC IV and DC home


-Follow-up with PCP in 1 to 2 weeks


-Follow-up with Cardiology in 1 to 2 weeks


- Follow up with Pulmonology in 1 to 2 weeks


-Please call Dr. Bronson at [phone] if any questions regarding hospital stay


-Please call nursing station at [phone] if any nursing or medication 

questions


-Return to the emergency room if symptoms worsen


Diet: Low sodium (AHA)


Activity: Fall precautions


Followup: 


[facility] [Primary Care Provider] - 


Johnny Blas MD [ACTIVE - CAN ADMIT] -

## 2024-08-27 NOTE — EKG
Test Date:    2024-08-23               Test Time:    11:21:22

Technician:   MAST                                   

                                                     

MEASUREMENT RESULTS:                                       

Intervals:                                           

Rate:         63                                     

OR:           186                                    

QRSD:         120                                    

QT:           428                                    

QTc:          437                                    

Axis:                                                

P:            113                                    

OR:           186                                    

QRS:          -58                                    

T:            44                                     

                                                     

INTERPRETIVE STATEMENTS:                                       

                                                     

Sinus rhythm with premature supraventricular complexes

Left anterior fascicular block

Abnormal ECG

Compared to ECG 07/29/2023 00:32:54

First degree AV block no longer present

Left ventricular hypertrophy no longer present



Electronically Signed On 08-27-24 12:44:03 CDT by Lalo Byrne

## 2024-08-27 NOTE — EKG
Test Date:    2024-08-26               Test Time:    10:48:30

Technician:   LUIS                                   

                                                     

MEASUREMENT RESULTS:                                       

Intervals:                                           

Rate:         64                                     

MD:                                                  

QRSD:         152                                    

QT:           484                                    

QTc:          499                                    

Axis:                                                

P:                                                   

MD:                                                  

QRS:          106                                    

T:            -59                                    

                                                     

INTERPRETIVE STATEMENTS:                                       

                                                     

Sinus rhythm with frequent PVCs

Rightward axis

Nonspecific intraventricular block

Cannot rule out Inferior infarct, age undetermined

Abnormal ECG



Electronically Signed On 08-27-24 12:40:00 CDT by Lalo Byrne

## 2024-08-27 NOTE — P.PN
Subjective


Date of Service: 08/27/24


Chief Complaint: Pleural effusion left-sided chest pain


Subjective: Improving (Is improving the left side is a little sore no fever or 

chills)





Review of Systems


Unremarkable





Physical Examination





- Vital Signs


Temperature: 97.7 F


Blood Pressure: 148/55


Pulse: 63


Respirations: 16


Pulse Ox (%): 96





- Physical Exam


General: Alert, In no apparent distress


Neck: Supple


Respiratory: Diminished (Left side)


Cardiovascular: No edema, Regular rate/rhythm





- Studies


Medications List Reviewed: Yes





Assessment And Plan





- Current Problems (Diagnosis)


(1) Pleural effusion


Current Visit: Yes   Status: Acute   


Plan: 


Patient is 83 years of age admitted with left-sided pleural effusion is 

currently doing well for another x-ray possible thoracentesis patient is in 

negative fluid balance patient has a combined systolic and diastolic heart 

failure with left-sided pleural effusion on an echocardiogram with 

spironolactone and Lasix plan for discharge patient's edema has improved he 

feels much better condition satisfactory